# Patient Record
Sex: MALE | Race: BLACK OR AFRICAN AMERICAN | Employment: UNEMPLOYED | RURAL
[De-identification: names, ages, dates, MRNs, and addresses within clinical notes are randomized per-mention and may not be internally consistent; named-entity substitution may affect disease eponyms.]

---

## 2020-02-18 ENCOUNTER — OFFICE VISIT (OUTPATIENT)
Dept: INTERNAL MEDICINE CLINIC | Age: 55
End: 2020-02-18

## 2020-02-18 VITALS
BODY MASS INDEX: 42.66 KG/M2 | TEMPERATURE: 97.5 F | SYSTOLIC BLOOD PRESSURE: 129 MMHG | HEIGHT: 72 IN | DIASTOLIC BLOOD PRESSURE: 89 MMHG | WEIGHT: 315 LBS | HEART RATE: 85 BPM | OXYGEN SATURATION: 97 % | RESPIRATION RATE: 16 BRPM

## 2020-02-18 DIAGNOSIS — R10.30 LOWER ABDOMINAL PAIN: ICD-10-CM

## 2020-02-18 DIAGNOSIS — E11.65 TYPE 2 DIABETES MELLITUS WITH HYPERGLYCEMIA, WITHOUT LONG-TERM CURRENT USE OF INSULIN (HCC): Primary | ICD-10-CM

## 2020-02-18 DIAGNOSIS — E66.01 OBESITY, MORBID (HCC): ICD-10-CM

## 2020-02-18 DIAGNOSIS — I10 ESSENTIAL HYPERTENSION: ICD-10-CM

## 2020-02-18 RX ORDER — ASCORBIC ACID 250 MG
TABLET ORAL
COMMUNITY

## 2020-02-18 RX ORDER — LISINOPRIL 20 MG/1
TABLET ORAL
COMMUNITY
Start: 2020-01-14 | End: 2020-02-18 | Stop reason: SDUPTHER

## 2020-02-18 RX ORDER — METFORMIN HYDROCHLORIDE 500 MG/1
500 TABLET, EXTENDED RELEASE ORAL
COMMUNITY
Start: 2020-01-15 | End: 2020-02-18 | Stop reason: SDUPTHER

## 2020-02-18 RX ORDER — METFORMIN HYDROCHLORIDE 500 MG/1
500 TABLET, EXTENDED RELEASE ORAL
Qty: 90 TAB | Refills: 1 | Status: SHIPPED | OUTPATIENT
Start: 2020-02-18 | End: 2020-06-25 | Stop reason: DRUGHIGH

## 2020-02-18 RX ORDER — LISINOPRIL 20 MG/1
20 TABLET ORAL DAILY
Qty: 90 TAB | Refills: 1 | Status: SHIPPED | OUTPATIENT
Start: 2020-02-18 | End: 2020-07-08 | Stop reason: SDUPTHER

## 2020-02-18 RX ORDER — UREA 10 %
1000 LOTION (ML) TOPICAL
COMMUNITY
End: 2020-02-18

## 2020-02-18 NOTE — PROGRESS NOTES
Chief Complaint   Patient presents with   [address]     I have reviewed the patient's medical history in detail and updated the computerized patient record. Health Maintenance reviewed. Encouraged pt to discuss pt's wishes with spouse/partner/family and bring them in the next appt to follow thru with the Advanced Directive    Fall Risk Assessment, last 12 mths 2/18/2020   Able to walk? Yes   Fall in past 12 months? No       3 most recent PHQ Screens 2/18/2020   Little interest or pleasure in doing things Several days   Feeling down, depressed, irritable, or hopeless Several days   Total Score PHQ 2 2       Abuse Screening Questionnaire 2/18/2020   Do you ever feel afraid of your partner? N   Are you in a relationship with someone who physically or mentally threatens you? N   Is it safe for you to go home?  Y       ADL Assessment 2/18/2020   Feeding yourself No Help Needed   Getting from bed to chair No Help Needed   Getting dressed No Help Needed   Bathing or showering No Help Needed   Walk across the room (includes cane/walker) No Help Needed   Using the telphone No Help Needed   Taking your medications No Help Needed   Preparing meals No Help Needed   Managing money (expenses/bills) No Help Needed   Moderately strenuous housework (laundry) No Help Needed   Shopping for personal items (toiletries/medicines) No Help Needed   Shopping for groceries No Help Needed   Driving No Help Needed   Climbing a flight of stairs No Help Needed   Getting to places beyond walking distances No Help Needed

## 2020-02-18 NOTE — PROGRESS NOTES
Subjective:     Elsie Gallagher is a 48 y.o. male seen for follow-up of diabetes. Patient is new to this clinic. Comes in to establish care. Previous care was with . Reason for the change is: ?  About his medications, concerned about reactions liver problems which he is associated with some of his medication. Does not see his physician very often but his diabetes and hypertension have been reasonably well-controlled  He has had hypoglycemic attacks. .no  Blood sugar control has been good  He has diabetes and hypertension. Elsie Gallagher has the additional concern of c/o about the lisinopril and metformins safety and rxn      Diabetic Review of Systems: no polyuria or polydipsia, no chest pain, dyspnea or TIA's, no numbness, tingling or pain in extremities. No Known Allergies    Diet and Lifestyle: does not rigorously follow a diabetic diet, nonsmoker. Patient Active Problem List    Diagnosis Date Noted    Obesity, morbid ([address] 02/18/2020    Hyperglycemia due to type 2 diabetes mellitus ([facility] 02/18/2020    Essential hypertension 02/18/2020     Current Outpatient Medications   Medication Sig Dispense Refill    ascorbic acid, vitamin C, (VITAMIN C) 250 mg tablet Take  by mouth.  metFORMIN ER (GLUCOPHAGE XR) 500 mg tablet Take 1 Tab by mouth daily (with dinner). 90 Tab 1    lisinopril (PRINIVIL, ZESTRIL) 20 mg tablet Take 1 Tab by mouth daily. 90 Tab 1     No Known Allergies  Past Medical History:   Diagnosis Date    Diabetes ([facility]     Headache     Hypertension     Thyroid disease      Past Surgical History:   Procedure Laterality Date    CARDIAC SURG PROCEDURE UNLIST      HX COLONOSCOPY      HX ENDOSCOPY      HX ORTHOPAEDIC      repair quad muscle     No family history on file.   Social History     Tobacco Use    Smoking status: Light Tobacco Smoker     Types: Cigars    Smokeless tobacco: Never Used    Tobacco comment: 2 per mo cigar   Substance Use Topics    Alcohol use: Yes     Alcohol/week: 1.0 standard drinks     Types: 1 Shots of liquor per week     Comment: occ             Review of Systems  Pertinent items are noted in HPI. Objective:     Significant for the following:     Visit Vitals  /89 (BP 1 Location: Left arm, BP Patient Position: Sitting)   Pulse 85   Temp 97.5 °F (36.4 °C) (Oral)   Resp 16   Ht 6' (1.829 m)   Wt (!) 373 lb (169.2 kg)   SpO2 97%   BMI 50.59 kg/m²     Appearance: alert, well appearing, and in no distress. Exam: heart sounds normal rate, regular rhythm, normal S1, S2, no murmurs, rubs, clicks or gallops, chest clear  Foot exam: deferred    Lab review: orders written for new lab studies as appropriate; see orders. Assessment/Plan:     Follow-up diabetes stable. Diabetic issues reviewed with him: all medications, side effects and compliance discussed carefully, diabetic Sick Day rules reviewed, handout given, glycohemoglobin and other lab monitoring discussed and long term diabetic complications discussed. Chronic Conditions Addressed Today     1. Obesity, morbid (HCC)     Relevant Medications     metFORMIN ER (GLUCOPHAGE XR) 500 mg tablet    2. Hyperglycemia due to type 2 diabetes mellitus (HCC) - Primary     Relevant Medications     metFORMIN ER (GLUCOPHAGE XR) 500 mg tablet     lisinopril (PRINIVIL, ZESTRIL) 20 mg tablet     Other Relevant Orders     HEMOGLOBIN A1C WITH EAG    3. Essential hypertension     Relevant Medications     lisinopril (PRINIVIL, ZESTRIL) 20 mg tablet     Other Relevant Orders     METABOLIC PANEL, COMPREHENSIVE     LIPID PANEL      Acute Diagnoses Addressed Today     Lower abdominal pain            See patient instructions, went over them personally with the patient. Emphasized compliance. Questions answered. Patient states that they understand the plan of action and will call if there are any issues or misunderstandings.     Orders Placed This Encounter    METABOLIC PANEL, COMPREHENSIVE     Standing Status: Future     Standing Expiration Date:   8/20/2020    LIPID PANEL     Standing Status:   Future     Standing Expiration Date:   8/20/2020    HEMOGLOBIN A1C WITH EAG     Standing Status:   Future     Standing Expiration Date:   8/17/2020    DISCONTD: metFORMIN ER (GLUCOPHAGE XR) 500 mg tablet     Sig: Take 500 mg by mouth.  DISCONTD: lisinopril (PRINIVIL, ZESTRIL) 20 mg tablet    DISCONTD: cyanocobalamin (VITAMIN B12) 100 mcg tablet     Sig: Take 1,000 mcg by mouth.  ascorbic acid, vitamin C, (VITAMIN C) 250 mg tablet     Sig: Take  by mouth.  metFORMIN ER (GLUCOPHAGE XR) 500 mg tablet     Sig: Take 1 Tab by mouth daily (with dinner). Dispense:  90 Tab     Refill:  1    lisinopril (PRINIVIL, ZESTRIL) 20 mg tablet     Sig: Take 1 Tab by mouth daily. Dispense:  90 Tab     Refill:  1     Spent some time discussing possible reactions side effects other issues associated with the medicines which she currently takes. Went ahead and refilled him. Encouraged weight loss and following a diabetic diet. Follow-up and Dispositions    · Return in about 4 months (around 6/18/2020) for routine follow up.

## 2020-02-18 NOTE — PATIENT INSTRUCTIONS
Learning About Meal Planning for Diabetes Why plan your meals? Meal planning can be a key part of managing diabetes. Planning meals and snacks with the right balance of carbohydrate, protein, and fat can help you keep your blood sugar at the target level you set with your doctor. You don't have to eat special foods. You can eat what your family eats, including sweets once in a while. But you do have to pay attention to how often you eat and how much you eat of certain foods. You may want to work with a dietitian or a certified diabetes educator. He or she can give you tips and meal ideas and can answer your questions about meal planning. This health professional can also help you reach a healthy weight if that is one of your goals. What plan is right for you? Your dietitian or diabetes educator may suggest that you start with the plate format or carbohydrate counting. The plate format The plate format is a simple way to help you manage how you eat. You plan meals by learning how much space each food should take on a plate. Using the plate format helps you spread carbohydrate throughout the day. It can make it easier to keep your blood sugar level within your target range. It also helps you see if you're eating healthy portion sizes. To use the plate format, you put non-starchy vegetables on half your plate. Add meat or meat substitutes on one-quarter of the plate. Put a grain or starchy vegetable (such as brown rice or a potato) on the final quarter of the plate. You can add a small piece of fruit and some low-fat or fat-free milk or yogurt, depending on your carbohydrate goal for each meal. 
Here are some tips for using the plate format: · Make sure that you are not using an oversized plate. A 9-inch plate is best. Many restaurants use larger plates. · Get used to using the plate format at home. Then you can use it when you eat out. · Write down your questions about using the plate format. Talk to your doctor, a dietitian, or a diabetes educator about your concerns. Carbohydrate counting With carbohydrate counting, you plan meals based on the amount of carbohydrate in each food. Carbohydrate raises blood sugar higher and more quickly than any other nutrient. It is found in desserts, breads and cereals, and fruit. It's also found in starchy vegetables such as potatoes and corn, grains such as rice and pasta, and milk and yogurt. Spreading carbohydrate throughout the day helps keep your blood sugar levels within your target range. Your daily amount depends on several things, including your weight, how active you are, which diabetes medicines you take, and what your goals are for your blood sugar levels. A registered dietitian or diabetes educator can help you plan how much carbohydrate to include in each meal and snack. A guideline for your daily amount of carbohydrate is: · 45 to 60 grams at each meal. That's about the same as 3 to 4 carbohydrate servings. · 15 to 20 grams at each snack. That's about the same as 1 carbohydrate serving. The Nutrition Facts label on packaged foods tells you how much carbohydrate is in a serving of the food. First, look at the serving size on the food label. Is that the amount you eat in a serving? All of the nutrition information on a food label is based on that serving size. So if you eat more or less than that, you'll need to adjust the other numbers. Total carbohydrate is the next thing you need to look for on the label. If you count carbohydrate servings, one serving of carbohydrate is 15 grams. For foods that don't come with labels, such as fresh fruits and vegetables, you'll need a guide that lists carbohydrate in these foods. Ask your doctor, dietitian, or diabetes educator about books or other nutrition guides you can use.  
If you take insulin, you need to know how many grams of carbohydrate are in a meal. This lets you know how much rapid-acting insulin to take before you eat. If you use an insulin pump, you get a constant rate of insulin during the day. So the pump must be programmed at meals to give you extra insulin to cover the rise in blood sugar after meals. When you know how much carbohydrate you will eat, you can take the right amount of insulin. Or, if you always use the same amount of insulin, you need to make sure that you eat the same amount of carbohydrate at meals. If you need more help to understand carbohydrate counting and food labels, ask your doctor, dietitian, or diabetes educator. How do you get started with meal planning? Here are some tips to get started: 
· Plan your meals a week at a time. Don't forget to include snacks too. · Use cookbooks or online recipes to plan several main meals. Plan some quick meals for busy nights. You also can double some recipes that freeze well. Then you can save half for other busy nights when you don't have time to cook. · Make sure you have the ingredients you need for your recipes. If you're running low on basic items, put these items on your shopping list too. · List foods that you use to make breakfasts, lunches, and snacks. List plenty of fruits and vegetables. · Post this list on the refrigerator. Add to it as you think of more things you need. · Take the list to the store to do your weekly shopping. Follow-up care is a key part of your treatment and safety. Be sure to make and go to all appointments, and call your doctor if you are having problems. It's also a good idea to know your test results and keep a list of the medicines you take. Where can you learn more? Go to http://lorena-walter.info/. Brock Hernandez in the search box to learn more about \"Learning About Meal Planning for Diabetes. \" Current as of: April 16, 2019 Content Version: 12.2 © 2765-3623 [employer].  Care instructions adapted under license by [facility] [address] (which disclaims liability or warranty for this information). If you have questions about a medical condition or this instruction, always ask your healthcare professional. [address] any warranty or liability for your use of this information. Learning About Diabetes Food Guidelines Your Care Instructions Meal planning is important to manage diabetes. It helps keep your blood sugar at a target level (which you set with your doctor). You don't have to eat special foods. You can eat what your family eats, including sweets once in a while. But you do have to pay attention to how often you eat and how much you eat of certain foods. You may want to work with a dietitian or a certified diabetes educator (CDE) to help you plan meals and snacks. A dietitian or CDE can also help you lose weight if that is one of your goals. What should you know about eating carbs? Managing the amount of carbohydrate (carbs) you eat is an important part of healthy meals when you have diabetes. Carbohydrate is found in many foods. · Learn which foods have carbs. And learn the amounts of carbs in different foods. ? Bread, cereal, pasta, and rice have about 15 grams of carbs in a serving. A serving is 1 slice of bread (1 ounce), ½ cup of cooked cereal, or 1/3 cup of cooked pasta or rice. ? Fruits have 15 grams of carbs in a serving. A serving is 1 small fresh fruit, such as an apple or orange; ½ of a banana; ½ cup of cooked or canned fruit; ½ cup of fruit juice; 1 cup of melon or raspberries; or 2 tablespoons of dried fruit. ? Milk and no-sugar-added yogurt have 15 grams of carbs in a serving. A serving is 1 cup of milk or 2/3 cup of no-sugar-added yogurt. ? Starchy vegetables have 15 grams of carbs in a serving.  A serving is ½ cup of mashed potatoes or sweet potato; 1 cup winter squash; ½ of a small baked potato; ½ cup of cooked beans; or ½ cup cooked corn or green peas. · Learn how much carbs to eat each day and at each meal. A dietitian or CDE can teach you how to keep track of the amount of carbs you eat. This is called carbohydrate counting. · If you are not sure how to count carbohydrate grams, use the Plate Method to plan meals. It is a good, quick way to make sure that you have a balanced meal. It also helps you spread carbs throughout the day. ? Divide your plate by types of foods. Put non-starchy vegetables on half the plate, meat or other protein food on one-quarter of the plate, and a grain or starchy vegetable in the final quarter of the plate. To this you can add a small piece of fruit and 1 cup of milk or yogurt, depending on how many carbs you are supposed to eat at a meal. 
· Try to eat about the same amount of carbs at each meal. Do not \"save up\" your daily allowance of carbs to eat at one meal. 
· Proteins have very little or no carbs per serving. Examples of proteins are beef, chicken, turkey, fish, eggs, tofu, cheese, cottage cheese, and peanut butter. A serving size of meat is 3 ounces, which is about the size of a deck of cards. Examples of meat substitute serving sizes (equal to 1 ounce of meat) are 1/4 cup of cottage cheese, 1 egg, 1 tablespoon of peanut butter, and ½ cup of tofu. How can you eat out and still eat healthy? · Learn to estimate the serving sizes of foods that have carbohydrate. If you measure food at home, it will be easier to estimate the amount in a serving of restaurant food. · If the meal you order has too much carbohydrate (such as potatoes, corn, or baked beans), ask to have a low-carbohydrate food instead. Ask for a salad or green vegetables. · If you use insulin, check your blood sugar before and after eating out to help you plan how much to eat in the future.  
· If you eat more carbohydrate at a meal than you had planned, take a walk or do other exercise. This will help lower your blood sugar. What else should you know? · Limit saturated fat, such as the fat from meat and dairy products. This is a healthy choice because people who have diabetes are at higher risk of heart disease. So choose lean cuts of meat and nonfat or low-fat dairy products. Use olive or canola oil instead of butter or shortening when cooking. · Don't skip meals. Your blood sugar may drop too low if you skip meals and take insulin or certain medicines for diabetes. · Check with your doctor before you drink alcohol. Alcohol can cause your blood sugar to drop too low. Alcohol can also cause a bad reaction if you take certain diabetes medicines. Follow-up care is a key part of your treatment and safety. Be sure to make and go to all appointments, and call your doctor if you are having problems. It's also a good idea to know your test results and keep a list of the medicines you take. Where can you learn more? Go to http://lorena-walter.info/. Enter L412 in the search box to learn more about \"Learning About Diabetes Food Guidelines. \" Current as of: April 16, 2019 Content Version: 12.2 © 5825-2367 [employer]. Care instructions adapted under license by [facility] (which disclaims liability or warranty for this information). If you have questions about a medical condition or this instruction, always ask your healthcare professional. [address] any warranty or liability for your use of this information. Learning About Low-Carbohydrate Diets for Weight Loss What is a low-carbohydrate diet? Low-carb diets avoid foods that are high in carbohydrate. These high-carb foods include pasta, bread, rice, cereal, fruits, and starchy vegetables. Instead, these diets usually have you eat foods that are high in fat and protein. Many people lose weight quickly on a low-carb diet.  But the early weight loss is water. People on this diet often gain the weight back after they start eating carbs again. Not all diet plans are safe or work well. A lot of the evidence shows that low-carb diets aren't healthy. That's because these diets often don't include healthy foods like fruits and vegetables. Losing weight safely means balancing protein, fat, and carbs with every meal and snack. And low-carb diets don't always provide the vitamins, minerals, and fiber you need. If you have a serious medical condition, talk to your doctor before you try any diet. These conditions include kidney disease, heart disease, type 2 diabetes, high cholesterol, and high blood pressure. If you are pregnant, it may not be safe for your baby if you are on a low-carb diet. How can you lose weight safely? You might have heard that a diet plan helped another person lose weight. But that doesn't mean that it will work for you. It is very hard to stay on a diet that includes lots of big changes in your eating habits. If you want to get to a healthy weight and stay there, making healthy lifestyle changes will often work better than dieting. These steps can help. · Make a plan for change. Work with your doctor to create a plan that is right for you. · See a dietitian. He or she can show you how to make healthy changes in your eating habits. · Manage stress. If you have a lot of stress in your life, it can be hard to focus on making healthy changes to your daily habits. · Track your food and activity. You are likely to do better at losing weight if you keep track of what you eat and what you do. Follow-up care is a key part of your treatment and safety. Be sure to make and go to all appointments, and call your doctor if you are having problems. It's also a good idea to know your test results and keep a list of the medicines you take. Where can you learn more? Go to http://lorena-walter.info/. Enter A121 in the search box to learn more about \"Learning About Low-Carbohydrate Diets for Weight Loss. \" Current as of: November 7, 2018 Content Version: 12.2 © 4292-5498 [employer]. Care instructions adapted under license by [employer] (which disclaims liability or warranty for this information). If you have questions about a medical condition or this instruction, always ask your healthcare professional. [place] [address] any warranty or liability for your use of this information.

## 2020-05-06 ENCOUNTER — TELEPHONE (OUTPATIENT)
Dept: INTERNAL MEDICINE CLINIC | Age: 55
End: 2020-05-06

## 2020-05-06 NOTE — TELEPHONE ENCOUNTER
Called pt to set up an appt to do A1C check. He said that he will go to [facility] to get it done once everything clears up. Pt said that he may possibly try to go this week.

## 2020-06-25 ENCOUNTER — TELEPHONE (OUTPATIENT)
Dept: INTERNAL MEDICINE CLINIC | Age: 55
End: 2020-06-25

## 2020-06-25 LAB
ALBUMIN SERPL-MCNC: 4 G/DL (ref 3.8–4.9)
ALBUMIN/GLOB SERPL: 1.4 {RATIO} (ref 1.2–2.2)
ALP SERPL-CCNC: 73 IU/L (ref 39–117)
ALT SERPL-CCNC: 24 IU/L (ref 0–44)
AST SERPL-CCNC: 13 IU/L (ref 0–40)
BILIRUB SERPL-MCNC: <0.2 MG/DL (ref 0–1.2)
BUN SERPL-MCNC: 13 MG/DL (ref 6–24)
BUN/CREAT SERPL: 18 (ref 9–20)
CALCIUM SERPL-MCNC: 9.4 MG/DL (ref 8.7–10.2)
CHLORIDE SERPL-SCNC: 100 MMOL/L (ref 96–106)
CHOLEST SERPL-MCNC: 145 MG/DL (ref 100–199)
CO2 SERPL-SCNC: 24 MMOL/L (ref 20–29)
CREAT SERPL-MCNC: 0.74 MG/DL (ref 0.76–1.27)
EST. AVERAGE GLUCOSE BLD GHB EST-MCNC: 194 MG/DL
GLOBULIN SER CALC-MCNC: 2.8 G/DL (ref 1.5–4.5)
GLUCOSE SERPL-MCNC: 224 MG/DL (ref 65–99)
HBA1C MFR BLD: 8.4 % (ref 4.8–5.6)
HDLC SERPL-MCNC: 47 MG/DL
INTERPRETATION, 910389: NORMAL
LDLC SERPL CALC-MCNC: 66 MG/DL (ref 0–99)
Lab: NORMAL
POTASSIUM SERPL-SCNC: 4.8 MMOL/L (ref 3.5–5.2)
PROT SERPL-MCNC: 6.8 G/DL (ref 6–8.5)
SODIUM SERPL-SCNC: 139 MMOL/L (ref 134–144)
TRIGL SERPL-MCNC: 162 MG/DL (ref 0–149)
VLDLC SERPL CALC-MCNC: 32 MG/DL (ref 5–40)

## 2020-06-25 RX ORDER — METFORMIN HYDROCHLORIDE 500 MG/1
500 TABLET ORAL 2 TIMES DAILY WITH MEALS
Qty: 180 TAB | Refills: 3 | Status: SHIPPED | OUTPATIENT
Start: 2020-06-25 | End: 2020-07-08 | Stop reason: SDUPTHER

## 2020-06-25 NOTE — TELEPHONE ENCOUNTER
Send normal/stable results letter. Your results are normal/stable. If not signed up, consider getting my chart to get your results on-line. We can help you to sign up. A1C is OKay but a hint higher than we like. As we discussed, change metformin ER to regular metformin which you will take twice daily. We will see you for your regularly scheduled follow-up.

## 2020-06-25 NOTE — LETTER
6/25/2020 3:09 PM 
 
Mr. Finch Left [address] [place] [id number] Dear  [facility]: 
 
Your results are normal/stable. If not signed up, consider getting my chart to get your results on-line. We can help you to sign up. A1C is OKay but a hint higher than we like. As we discussed, change metformin ER to regular metformin which you will take twice daily. We will see you for your regularly scheduled follow-up. Sincerely, Bette Solis MD

## 2020-07-08 NOTE — TELEPHONE ENCOUNTER
Requested Prescriptions     Pending Prescriptions Disp Refills    lisinopriL (PRINIVIL, ZESTRIL) 20 mg tablet 90 Tab 1     Sig: Take 1 Tab by mouth daily.  metFORMIN (GLUCOPHAGE) 500 mg tablet 180 Tab 3     Sig: Take 1 Tab by mouth two (2) times daily (with meals).

## 2020-07-09 RX ORDER — METFORMIN HYDROCHLORIDE 500 MG/1
500 TABLET ORAL 2 TIMES DAILY WITH MEALS
Qty: 180 TAB | Refills: 3 | Status: SHIPPED | OUTPATIENT
Start: 2020-07-09 | End: 2020-11-10 | Stop reason: ALTCHOICE

## 2020-07-09 RX ORDER — LISINOPRIL 20 MG/1
20 TABLET ORAL DAILY
Qty: 90 TAB | Refills: 1 | Status: SHIPPED | OUTPATIENT
Start: 2020-07-09 | End: 2020-11-10 | Stop reason: SDUPTHER

## 2020-07-14 ENCOUNTER — OFFICE VISIT (OUTPATIENT)
Dept: INTERNAL MEDICINE CLINIC | Age: 55
End: 2020-07-14

## 2020-07-14 VITALS
BODY MASS INDEX: 42.66 KG/M2 | RESPIRATION RATE: 18 BRPM | SYSTOLIC BLOOD PRESSURE: 126 MMHG | HEART RATE: 89 BPM | TEMPERATURE: 97.8 F | DIASTOLIC BLOOD PRESSURE: 83 MMHG | OXYGEN SATURATION: 96 % | HEIGHT: 72 IN | WEIGHT: 315 LBS

## 2020-07-14 DIAGNOSIS — R41.3 COMPLAINTS OF MEMORY DISTURBANCE: ICD-10-CM

## 2020-07-14 DIAGNOSIS — Z12.5 SCREENING PSA (PROSTATE SPECIFIC ANTIGEN): ICD-10-CM

## 2020-07-14 DIAGNOSIS — E66.01 OBESITY, MORBID (HCC): ICD-10-CM

## 2020-07-14 DIAGNOSIS — E11.65 TYPE 2 DIABETES MELLITUS WITH HYPERGLYCEMIA, WITHOUT LONG-TERM CURRENT USE OF INSULIN (HCC): Primary | ICD-10-CM

## 2020-07-14 DIAGNOSIS — I10 ESSENTIAL HYPERTENSION: ICD-10-CM

## 2020-07-14 LAB
BILIRUB UR QL STRIP: NEGATIVE
GLUCOSE UR-MCNC: NEGATIVE MG/DL
KETONES P FAST UR STRIP-MCNC: NEGATIVE MG/DL
PH UR STRIP: 5.5 [PH] (ref 4.6–8)
PROT UR QL STRIP: NEGATIVE
SP GR UR STRIP: 1.02 (ref 1–1.03)
UA UROBILINOGEN AMB POC: NORMAL (ref 0.2–1)
URINALYSIS CLARITY POC: CLEAR
URINALYSIS COLOR POC: NORMAL
URINE BLOOD POC: NEGATIVE
URINE LEUKOCYTES POC: NEGATIVE
URINE NITRITES POC: NEGATIVE

## 2020-07-14 NOTE — PATIENT INSTRUCTIONS
SGLT2 Inhibitors:  Pedro Patel    GLP agonist: Domingo Garcia Tanzeum Rybelsus            Nutrition Tips for Diabetes: After Your Visit  Your Care Instructions  A healthy diet is important to manage diabetes. It helps you lose weight (if you need to) and keep it off. It gives you the nutrition and energy your body needs and helps prevent heart disease. But a diet for diabetes does not mean that you have to eat special foods. You can eat what your family eats, including occasional sweets and other favorites. But you do have to pay attention to how often you eat and how much you eat of certain foods. The right plan for you will give you meals that help you keep your blood sugar at healthy levels. Try to eat a variety of foods and to spread carbohydrate throughout the day. Carbohydrate raises blood sugar higher and more quickly than any other nutrient does. Carbohydrate is found in sugar, breads and cereals, fruit, starchy vegetables such as potatoes and corn, and milk and yogurt. You may want to work with a dietitian or diabetes educator to help you plan meals and snacks. A dietitian or diabetes educator also can help you lose weight if that is one of your goals. The following tips can help you enjoy your meals and stay healthy. Follow-up care is a key part of your treatment and safety. Be sure to make and go to all appointments, and call your doctor if you are having problems. Its also a good idea to know your test results and keep a list of the medicines you take. How can you care for yourself at home? · Learn which foods have carbohydrate and how much carbohydrate to eat. A dietitian or diabetes educator can help you learn to keep track of how much carbohydrate you eat. · Spread carbohydrate throughout the day. Eat some carbohydrate at all meals, but do not eat too much at any one time. · Plan meals to include food from all the food groups.  These are the food groups and some example portion sizes:  ¨ Grains: 1 slice of bread (1 ounce), ½ cup of cooked cereal, and 1/3 cup of cooked pasta or rice. These have about 15 grams of carbohydrate in a serving. Choose whole grains such as whole wheat bread or crackers, oatmeal, and brown rice more often than refined grains. ¨ Fruit: 1 small fresh fruit, such as an apple or orange; ½ of a banana; ½ cup of chopped, cooked, or canned fruit; ½ cup of fruit juice; 1 cup of melon or raspberries; and 2 tablespoons of dried fruit. These have about 15 grams of carbohydrate in a serving. ¨ Dairy: 1 cup of nonfat or low-fat milk and 2/3 cup of plain yogurt. These have about 15 grams of carbohydrate in a serving. ¨ Protein foods: Beef, chicken, turkey, fish, eggs, tofu, cheese, cottage cheese, and peanut butter. A serving size of meat is 3 ounces, which is about the size of a deck of cards. Examples of meat substitute serving sizes (equal to 1 ounce of meat) are 1/4 cup of cottage cheese, 1 egg, 1 tablespoon of peanut butter, and ½ cup of tofu. These have very little or no carbohydrate per serving. ¨ Vegetables: Starchy vegetables such as ½ cup of cooked dried beans, peas, potatoes, or corn have about 15 grams of carbohydrate. Nonstarchy vegetables have very little carbohydrate, such as 1 cup of raw leafy vegetables (such as spinach), ½ cup of other vegetables (cooked or chopped), and 3/4 cup of vegetable juice. · Use the plate format to plan meals. It is a good, quick way to make sure that you have a balanced meal. It also helps you spread carbohydrate throughout the day. You divide your plate by types of foods. Put vegetables on half the plate, meat or meat substitutes on one-quarter of the plate, and a grain or starchy vegetable (such as brown rice or a potato) in the final quarter of the plate.  To this you can add a small piece of fruit and 1 cup of milk or yogurt, depending on how much carbohydrate you are supposed to eat at a meal.  · Talk to your dietitian or diabetes educator about ways to add limited amounts of sweets into your meal plan. You can eat these foods now and then, as long as you include the amount of carbohydrate they have in your daily carbohydrate allowance. · If you drink alcohol, limit it to no more than 1 drink a day for women and 2 drinks a day for men. If you are pregnant, no amount of alcohol is known to be safe. · Protein, fat, and fiber do not raise blood sugar as much as carbohydrate does. If you eat a lot of these nutrients in a meal, your blood sugar will rise more slowly than it would otherwise. · Limit saturated fats, such as those from meat and dairy products. Try to replace it with monounsaturated fat, such as olive oil. This is a healthier choice because people who have diabetes are at higher-than-average risk of heart disease. But use a modest amount of olive oil. A tablespoon of olive oil has 14 grams of fat and 120 calories. · Exercise lowers blood sugar. If you take insulin by shots or pump, you can use less than you would if you were not exercising. Keep in mind that timing matters. If you exercise within 1 hour after a meal, your body may need less insulin for that meal than it would if you exercised 3 hours after the meal. Test your blood sugar to find out how exercise affects your need for insulin. · Exercise on most days of the week. Aim for at least 30 minutes. Exercise helps you stay at a healthy weight and helps your body use insulin. Walking is an easy way to get exercise. Gradually increase the amount you walk every day. You also may want to swim, bike, or do other activities. When you eat out  · Learn to estimate the serving sizes of foods that have carbohydrate. If you measure food at home, it will be easier to estimate the amount in a serving of restaurant food.   · If the meal you order has too much carbohydrate (such as potatoes, corn, or baked beans), ask to have a low-carbohydrate food instead. Ask for a salad or green vegetables. · If you use insulin, check your blood sugar before and after eating out to help you plan how much to eat in the future. · If you eat more carbohydrate at a meal than you had planned, take a walk or do other exercise. This will help lower your blood sugar. Where can you learn more? Go to [employer].be  Enter V200 in the search box to learn more about \"Nutrition Tips for Diabetes: After Your Visit. \"   © 1655-0257 Healthwise, [employer]. Care instructions adapted under license by [employer] (which disclaims liability or warranty for this information). This care instruction is for use with your licensed healthcare professional. If you have questions about a medical condition or this instruction, always ask your healthcare professional. [address] any warranty or liability for your use of this information. Content Version: 39.4.335288; Current as of: June 4, 2014                 Learning About Diabetes Food Guidelines  Your Care Instructions     Meal planning is important to manage diabetes. It helps keep your blood sugar at a target level (which you set with your doctor). You don't have to eat special foods. You can eat what your family eats, including sweets once in a while. But you do have to pay attention to how often you eat and how much you eat of certain foods. You may want to work with a dietitian or a certified diabetes educator (CDE) to help you plan meals and snacks. A dietitian or CDE can also help you lose weight if that is one of your goals. What should you know about eating carbs? Managing the amount of carbohydrate (carbs) you eat is an important part of healthy meals when you have diabetes. Carbohydrate is found in many foods. · Learn which foods have carbs. And learn the amounts of carbs in different foods.   ? Bread, cereal, pasta, and rice have about 15 grams of carbs in a serving. A serving is 1 slice of bread (1 ounce), ½ cup of cooked cereal, or 1/3 cup of cooked pasta or rice. ? Fruits have 15 grams of carbs in a serving. A serving is 1 small fresh fruit, such as an apple or orange; ½ of a banana; ½ cup of cooked or canned fruit; ½ cup of fruit juice; 1 cup of melon or raspberries; or 2 tablespoons of dried fruit. ? Milk and no-sugar-added yogurt have 15 grams of carbs in a serving. A serving is 1 cup of milk or 2/3 cup of no-sugar-added yogurt. ? Starchy vegetables have 15 grams of carbs in a serving. A serving is ½ cup of mashed potatoes or sweet potato; 1 cup winter squash; ½ of a small baked potato; ½ cup of cooked beans; or ½ cup cooked corn or green peas. · Learn how much carbs to eat each day and at each meal. A dietitian or CDE can teach you how to keep track of the amount of carbs you eat. This is called carbohydrate counting. · If you are not sure how to count carbohydrate grams, use the Plate Method to plan meals. It is a good, quick way to make sure that you have a balanced meal. It also helps you spread carbs throughout the day. ? Divide your plate by types of foods. Put non-starchy vegetables on half the plate, meat or other protein food on one-quarter of the plate, and a grain or starchy vegetable in the final quarter of the plate. To this you can add a small piece of fruit and 1 cup of milk or yogurt, depending on how many carbs you are supposed to eat at a meal.  · Try to eat about the same amount of carbs at each meal. Do not \"save up\" your daily allowance of carbs to eat at one meal.  · Proteins have very little or no carbs per serving. Examples of proteins are beef, chicken, turkey, fish, eggs, tofu, cheese, cottage cheese, and peanut butter. A serving size of meat is 3 ounces, which is about the size of a deck of cards.  Examples of meat substitute serving sizes (equal to 1 ounce of meat) are 1/4 cup of cottage cheese, 1 egg, 1 tablespoon of peanut butter, and ½ cup of tofu. How can you eat out and still eat healthy? · Learn to estimate the serving sizes of foods that have carbohydrate. If you measure food at home, it will be easier to estimate the amount in a serving of restaurant food. · If the meal you order has too much carbohydrate (such as potatoes, corn, or baked beans), ask to have a low-carbohydrate food instead. Ask for a salad or green vegetables. · If you use insulin, check your blood sugar before and after eating out to help you plan how much to eat in the future. · If you eat more carbohydrate at a meal than you had planned, take a walk or do other exercise. This will help lower your blood sugar. What else should you know? · Limit saturated fat, such as the fat from meat and dairy products. This is a healthy choice because people who have diabetes are at higher risk of heart disease. So choose lean cuts of meat and nonfat or low-fat dairy products. Use olive or canola oil instead of butter or shortening when cooking. · Don't skip meals. Your blood sugar may drop too low if you skip meals and take insulin or certain medicines for diabetes. · Check with your doctor before you drink alcohol. Alcohol can cause your blood sugar to drop too low. Alcohol can also cause a bad reaction if you take certain diabetes medicines. Follow-up care is a key part of your treatment and safety. Be sure to make and go to all appointments, and call your doctor if you are having problems. It's also a good idea to know your test results and keep a list of the medicines you take. Where can you learn more? Go to http://lorena-walter.info/  Enter I147 in the search box to learn more about \"Learning About Diabetes Food Guidelines. \"  Current as of: December 20, 2019               Content Version: 12.5  © 4510-0540 Healthwise, Incorporated.    Care instructions adapted under license by [employer] (which disclaims liability or warranty for this information). If you have questions about a medical condition or this instruction, always ask your healthcare professional. [address] any warranty or liability for your use of this information. Prostate Cancer Screening: Care Instructions  Your Care Instructions     Prostate cancer is the abnormal growth of cells in the prostate gland. This is an organ found just below a man's bladder. Screening can help find prostate cancer early. When it is found and treated early, the cancer may be cured. But it's not always treated. That's because the treatments can cause serious side effects. For most men, prostate cancer won't shorten their lives, especially if they are older and the cancer is growing slowly. Prostate cancer is the second most common type of cancer in men. Most cases occur in men older than 72. The disease runs in families. And it's more common in -American men. Follow-up care is a key part of your treatment and safety. Be sure to make and go to all appointments, and call your doctor if you are having problems. It's also a good idea to know your test results and keep a list of the medicines you take. What is the screening test for prostate cancer? The main screening test for prostate cancer is the prostate-specific antigen (PSA) test. This is a blood test that measures how much PSA is in your blood. A high level may mean that you have an enlarged prostate, an infection, or cancer. Along with the PSA test, you may have a digital (finger) rectal exam. This exam checks for anything abnormal in your prostate. To do the exam, the doctor puts a lubricated, gloved finger into your rectum. If these tests suggest cancer, you may need a prostate biopsy. How is prostate cancer diagnosed? In a biopsy, the doctor takes small tissue samples from your prostate gland.  Another doctor then looks at the tissue under a microscope to see if there are cancer cells, signs of infection, or other problems. The results help diagnose prostate cancer. What are the pros and cons of screening? Neither a PSA test nor a digital rectal exam can tell you for sure that you do or do not have cancer. But they can help you decide if you need more tests, such as a prostate biopsy. Screening tests may be useful because most men with prostate cancer don't have symptoms. It can be hard to know if you have cancer until it is more advanced. And then it's harder to treat. But having a PSA test can also cause harm. The test may show high levels of PSA that aren't caused by cancer. So you could have a prostate biopsy you didn't need. Or the PSA test might be normal when there is cancer, so a cancer might not be found early. The test can also find cancers that would never have caused a problem during your lifetime. So you might have treatment that was not needed. Prostate cancer usually develops late in life and grows slowly. For many men, it does not shorten their lives. Some experts advise screening only for men who are at high risk. Talk with your doctor to see if screening is right for you. Where can you learn more? Go to http://www.gray.com/  Enter R550 in the search box to learn more about \"Prostate Cancer Screening: Care Instructions. \"  Current as of: August 22, 2019               Content Version: 12.5  © 6153-5663 Healthwise, Incorporated. Care instructions adapted under license by [employer] (which disclaims liability or warranty for this information). If you have questions about a medical condition or this instruction, always ask your healthcare professional. [place] [address] any warranty or liability for your use of this information.

## 2020-07-14 NOTE — PROGRESS NOTES
Chief Complaint   Patient presents with    Hypertension     follow up     Patient has not been out of the country in (3-4 months) 90 -120 days, NO diarrhea, NO cough, NO chest conjestion, NO temp. Pt has not been around anyone with these symptoms. Health Maintenance reviewed. I have reviewed the patient's medical history in detail and updated the computerized patient record. Patient has not been out of the country in (3-4 months) 90 -120 days, NO diarrhea, NO cough, NO chest conjestion, NO temp. Pt has not been around anyone with these symptoms. 1. Have you been to the ER, urgent care clinic since your last visit? Hospitalized since your last visit?no    2. Have you seen or consulted any other health care providers outside of the [address] [place] [address] since your last visit? Include any pap smears or colon screening. No    Encouraged pt to discuss pt's wishes with spouse/partner/family and bring them in the next appt to follow thru with the Advanced Directive    Fall Risk Assessment, last 12 mths 7/14/2020   Able to walk? Yes   Fall in past 12 months? No       3 most recent PHQ Screens 7/14/2020   Little interest or pleasure in doing things Several days   Feeling down, depressed, irritable, or hopeless Several days   Total Score PHQ 2 2       Abuse Screening Questionnaire 7/14/2020   Do you ever feel afraid of your partner? N   Are you in a relationship with someone who physically or mentally threatens you? N   Is it safe for you to go home?  Y       ADL Assessment 7/14/2020   Feeding yourself No Help Needed   Getting from bed to chair No Help Needed   Getting dressed No Help Needed   Bathing or showering No Help Needed   Walk across the room (includes cane/walker) No Help Needed   Using the telphone No Help Needed   Taking your medications No Help Needed   Preparing meals No Help Needed   Managing money (expenses/bills) No Help Needed   Moderately strenuous housework (laundry) No Help Needed Shopping for personal items (toiletries/medicines) No Help Needed   Shopping for groceries No Help Needed   Driving No Help Needed   Climbing a flight of stairs No Help Needed   Getting to places beyond walking distances -

## 2020-07-15 LAB
ALBUMIN/CREAT UR: 11 MG/G CREAT (ref 0–29)
CREAT UR-MCNC: 115.2 MG/DL
MICROALBUMIN UR-MCNC: 13 UG/ML

## 2020-07-16 NOTE — PROGRESS NOTES
Subjective:     Vinetta Najjar is a 48 y.o. male seen for follow-up of diabetes. Wants to get his PSA checked. He has had hypoglycemic attacks. .no  Blood sugar control has been so-so. Complains of being more forgetful, says his wife has noted that, symptoms times few months. Increased stress recently. Lab Results   Component Value Date/Time    Hemoglobin A1c 8.4 (H) 06/24/2020 01:06 PM    Glucose 224 (H) 06/24/2020 01:06 PM    Microalb/Creat ratio (ug/mg creat.) 11 07/14/2020 01:20 PM    LDL, calculated 66 06/24/2020 01:06 PM    Creatinine 0.74 (L) 06/24/2020 01:06 PM       He has diabetes, hypertension and hyperlipidemia. Vinetta Najjar has the additional concern of wants to get PSA checked, best to catch it before a CA spreads. Diabetic Review of Systems: no polyuria or polydipsia, no chest pain, dyspnea or TIA's, no numbness, tingling or pain in extremities. No Known Allergies    Diet and Lifestyle: does not rigorously follow a diabetic diet, nonsmoker. Patient Active Problem List    Diagnosis Date Noted    Obesity, morbid ([address] 02/18/2020    Hyperglycemia due to type 2 diabetes mellitus ([address] 02/18/2020    Essential hypertension 02/18/2020     Current Outpatient Medications   Medication Sig Dispense Refill    lisinopriL (PRINIVIL, ZESTRIL) 20 mg tablet Take 1 Tab by mouth daily. 90 Tab 1    metFORMIN (GLUCOPHAGE) 500 mg tablet Take 1 Tab by mouth two (2) times daily (with meals). 180 Tab 3    ascorbic acid, vitamin C, (VITAMIN C) 250 mg tablet Take  by mouth. No Known Allergies  Past Medical History:   Diagnosis Date    Diabetes ([address]     Headache     Hypertension     Thyroid disease      Social History     Tobacco Use    Smoking status: Current Some Day Smoker     Types: Cigars    Smokeless tobacco: Never Used    Tobacco comment: 2 per mo cigar   Substance Use Topics    Alcohol use:  Yes     Alcohol/week: 1.0 standard drinks     Types: 1 Shots of liquor per week Comment: occ        Lab Results   Component Value Date/Time    GFR est non- 06/24/2020 01:06 PM    GFR est  06/24/2020 01:06 PM    Creatinine 0.74 (L) 06/24/2020 01:06 PM    BUN 13 06/24/2020 01:06 PM    Sodium 139 06/24/2020 01:06 PM    Potassium 4.8 06/24/2020 01:06 PM    Chloride 100 06/24/2020 01:06 PM    CO2 24 06/24/2020 01:06 PM     Lab Results   Component Value Date/Time    Glucose 224 (H) 06/24/2020 01:06 PM         Review of Systems  Pertinent items are noted in HPI. Objective:     Significant for the following:     Visit Vitals  /83 (BP 1 Location: Left arm, BP Patient Position: Sitting)   Pulse 89   Temp 97.8 °F (36.6 °C) (Temporal)   Resp 18   Ht 6' (1.829 m)   Wt (!) 370 lb (167.8 kg)   SpO2 96%   BMI 50.18 kg/m²     Appearance: alert, well appearing, and in no distress and overweight. Exam: heart sounds normal rate, regular rhythm, normal S1, S2, no murmurs, rubs, clicks or gallops, chest clear, no hepatosplenomegaly  Foot exam: Diabetic foot exam was performed. No obvious sores or red lesions. Sensation checked by monofilament exam which was normal.  Dorsalis pedis pulse waspresent. Lab review: orders written for new lab studies as appropriate; see orders. Assessment/Plan:     Follow-up diabetes stable, control uncertain. Diabetic issues reviewed with him: all medications, side effects and compliance discussed carefully, glycohemoglobin and other lab monitoring discussed and long term diabetic complications discussed. Chronic Conditions Addressed Today     1. Obesity, morbid ([address]    2. Hyperglycemia due to type 2 diabetes mellitus ([facility] - Primary     Relevant Orders     HEMOGLOBIN A1C WITH EAG     AMB POC URINALYSIS DIP STICK MANUAL W/O MICRO (Completed)     MICROALBUMIN, UR, RAND W/ MICROALB/CREAT RATIO (Completed)    3.  Essential hypertension     Relevant Orders     METABOLIC PANEL, BASIC      Acute Diagnoses Addressed Today     Screening PSA (prostate specific antigen)            Relevant Orders        PSA, DIAGNOSTIC (PROSTATE SPECIFIC AG)    Complaints of memory disturbance            Relevant Orders        TSH 3RD GENERATION        We discussed a screening exam PSA and the  the pros and cons of having the test done. The patient made a decision to do the test: yes  Okay PSA  Diabetes borderline discussed may be increasing metformin versus adding Jardiance. He will think about it and will discuss when he follows up. Lab work done to [facility] issues. Labs from today were reviewed yes, labs done previously were reviewed  yes, Labs done in ER were reviewed  no, Additional labs are ordered  yes,    Follow-up and Dispositions    · Return in about 3 months (around 10/14/2020) for routine follow up.

## 2020-09-01 ENCOUNTER — TELEPHONE (OUTPATIENT)
Dept: INTERNAL MEDICINE CLINIC | Age: 55
End: 2020-09-01

## 2020-09-01 NOTE — TELEPHONE ENCOUNTER
----- Message from [employer] sent at 9/1/2020 10:10 AM EDT -----  Regarding: /Refill  Contact: [phone]  Caller (if not patient):self  Relationship of caller (if not patient):self  Best contact number(s): [phone]   Name of medication and dosage if known: \"indomethacin\" 25mgs  Is patient out of this medication (yes/no):yes  Pharmacy name:[facility] in [address] [place] [address] listed in chart? (yes/no):yes  Pharmacy phone [phone]  Date of last visit:7/14/2020  Details to clarify the request: Pt.needs  to prescribe him this medication. Pt. used to get this medication from his previous doctor for gout,from [facility] in [place] before switching to [facility].

## 2020-09-02 RX ORDER — INDOMETHACIN 25 MG/1
25 CAPSULE ORAL 3 TIMES DAILY
Qty: 60 CAP | Refills: 1 | Status: SHIPPED | OUTPATIENT
Start: 2020-09-02 | End: 2020-11-10 | Stop reason: SDUPTHER

## 2020-09-02 NOTE — TELEPHONE ENCOUNTER
Sent to Dr. Zoë Baca,  He want his indomethancin 25mg sent to [facility] in [place] is possible

## 2020-09-30 ENCOUNTER — VIRTUAL VISIT (OUTPATIENT)
Dept: SLEEP MEDICINE | Age: 55
End: 2020-09-30

## 2020-09-30 ENCOUNTER — DOCUMENTATION ONLY (OUTPATIENT)
Dept: SLEEP MEDICINE | Age: 55
End: 2020-09-30

## 2020-09-30 DIAGNOSIS — G47.33 OSA (OBSTRUCTIVE SLEEP APNEA): Primary | ICD-10-CM

## 2020-09-30 DIAGNOSIS — I10 ESSENTIAL HYPERTENSION: ICD-10-CM

## 2020-09-30 PROCEDURE — 99203 OFFICE O/P NEW LOW 30 MIN: CPT | Performed by: INTERNAL MEDICINE

## 2020-09-30 RX ORDER — NAPROXEN 250 MG/1
TABLET ORAL 2 TIMES DAILY WITH MEALS
COMMUNITY
End: 2020-11-10 | Stop reason: ALTCHOICE

## 2020-09-30 RX ORDER — ACETAMINOPHEN 325 MG/1
650 TABLET ORAL
COMMUNITY

## 2020-09-30 NOTE — PATIENT INSTRUCTIONS
217 [facility] [place]. 1668 [facility], [address] Tel.  [phone] Fax. 100 [facility] [address] [place], [address] [facility] Tel.  [phone] Fax. [phone] [address] Tel.  [phone] Fax. [phone] Sleep Apnea: After Your Visit Your Care Instructions Sleep apnea occurs when you frequently stop breathing for 10 seconds or longer during sleep. It can be mild to severe, based on the number of times per hour that you stop breathing or have slowed breathing. Blocked or narrowed airways in your nose, mouth, or throat can cause sleep apnea. Your airway can become blocked when your throat muscles and tongue relax during sleep. Sleep apnea is common, occurring in 1 out of 20 individuals. Individuals having any of the following characteristics should be evaluated and treated right away due to high risk and detrimental consequences from untreated sleep apnea: 
1. Obesity 2. Congestive Heart failure 3. Atrial Fibrillation 4. Uncontrolled Hypertension 5. Type II Diabetes 6. Night-time Arrhythmias 7. Stroke 8. Pulmonary Hypertension 9. High-risk Driving Populations (pilots, truck drivers, etc.) 10. Patients Considering Weight-loss Surgery How do you know you have sleep apnea? You probably have sleep apnea if you answer 'yes' to 3 or more of the following questions: S - Have you been told that you Snore? T - Are you often Tired during the day? O - Has anyone Observed you stop breathing while sleeping? P- Do you have (or are being treated for) high blood Pressure? B - Are you obese (Body Mass Index > 35)? A - Is your Age 48years old or older? N - Is your Neck size greater than 16 inches? G - Are you male Gender? A sleep physician can prescribe a breathing device that prevents tissues in the throat from blocking your airway.  Or your doctor may recommend using a dental device (oral breathing device) to help keep your airway open. In some cases, surgery may be needed to remove enlarged tissues in the throat. Follow-up care is a key part of your treatment and safety. Be sure to make and go to all appointments, and call your doctor if you are having problems. It's also a good idea to know your test results and keep a list of the medicines you take. How can you care for yourself at home? · Lose weight, if needed. It may reduce the number of times you stop breathing or have slowed breathing. · Go to bed at the same time every night. · Sleep on your side. It may stop mild apnea. If you tend to roll onto your back, sew a pocket in the back of your pajama top. Put a tennis ball into the pocket, and stitch the pocket shut. This will help keep you from sleeping on your back. · Avoid alcohol and medicines such as sleeping pills and sedatives before bed. · Do not smoke. Smoking can make sleep apnea worse. If you need help quitting, talk to your doctor about stop-smoking programs and medicines. These can increase your chances of quitting for good. · Prop up the head of your bed 4 to 6 inches by putting bricks under the legs of the bed. · Treat breathing problems, such as a stuffy nose, caused by a cold or allergies. · Use a continuous positive airway pressure (CPAP) breathing machine if lifestyle changes do not help your apnea and your doctor recommends it. The machine keeps your airway from closing when you sleep. · If CPAP does not help you, ask your doctor whether you should try other breathing machines. A bilevel positive airway pressure machine has two types of air pressureâone for breathing in and one for breathing out. Another device raises or lowers air pressure as needed while you breathe. · If your nose feels dry or bleeds when using one of these machines, talk with your doctor about increasing moisture in the air. A humidifier may help.  
· If your nose is runny or stuffy from using a breathing machine, talk with your doctor about using decongestants or a corticosteroid nasal spray. When should you call for help? Watch closely for changes in your health, and be sure to contact your doctor if: 
· You still have sleep apnea even though you have made lifestyle changes. · You are thinking of trying a device such as CPAP. · You are having problems using a CPAP or similar machine. Where can you learn more? Go to [place]. Enter G447 in the search box to learn more about \"Sleep Apnea: After Your Visit. \"  
© 4869-1952 Healthwise, Incorporated. Care instructions adapted under license by [facility] (which disclaims liability or warranty for this information). This care instruction is for use with your licensed healthcare professional. If you have questions about a medical condition or this instruction, always ask your healthcare professional. Flavio Spikes any warranty or liability for your use of this information. PROPER SLEEP HYGIENE What to avoid · Do not have drinks with caffeine, such as coffee or black tea, for 8 hours before bed. · Do not smoke or use other types of tobacco near bedtime. Nicotine is a stimulant and can keep you awake. · Avoid drinking alcohol late in the evening, because it can cause you to wake in the middle of the night. · Do not eat a big meal close to bedtime. If you are hungry, eat a light snack. · Do not drink a lot of water close to bedtime, because the need to urinate may wake you up during the night. · Do not read or watch TV in bed. Use the bed only for sleeping and sexual activity. What to try · Go to bed at the same time every night, and wake up at the same time every morning. Do not take naps during the day. · Keep your bedroom quiet, dark, and cool. · Get regular exercise, but not within 3 to 4 hours of your bedtime. Owen Escalante · Sleep on a comfortable pillow and mattress. · If watching the clock makes you anxious, turn it facing away from you so you cannot see the time. · If you worry when you lie down, start a worry book. Well before bedtime, write down your worries, and then set the book and your concerns aside. · Try meditation or other relaxation techniques before you go to bed. · If you cannot fall asleep, get up and go to another room until you feel sleepy. Do something relaxing. Repeat your bedtime routine before you go to bed again. · Make your house quiet and calm about an hour before bedtime. Turn down the lights, turn off the TV, log off the computer, and turn down the volume on music. This can help you relax after a busy day. Drowsy Driving The [employer] cites drowsiness as a causing factor in more than 277,464 police reported crashes annually, resulting in 76,000 injuries and 1,500 deaths. Other surveys suggest 55% of people polled have driven while drowsy in the past year, 23% had fallen asleep but not crashed, 3% crashed, and 2% had and accident due to drowsy driving. Who is at risk? Young Drivers: One study of drowsy driving accidents states that 55% of the drivers were under 25 years. Of those, 75% were male. Shift Workers and Travelers: People who work overnight or travel across time zones frequently are at higher risk of experiencing Circadian Rhythm Disorders. They are trying to work and function when their body is programed to sleep. Sleep Deprived: Lack of sleep has a serious impact on your ability to pay attention or focus on a task. Consistently getting less than the average of 8 hours your body needs creates partial or cumulative sleep deprivation. Untreated Sleep Disorders: Sleep Apnea, Narcolepsy, R.L.S., and other sleep disorders (untreated) prevent a person from getting enough restful sleep.  This leads to excessive daytime sleepiness and increases the risk for drowsy driving accidents by up to 7 times. Medications / Alcohol: Even over the counter medications can cause drowsiness. Medications that impair a drivers attention should have a warning label. Alcohol naturally makes you sleepy and on its own can cause accidents. Combined with excessive drowsiness its effects are amplified. Signs of Drowsy Driving: * You don't remember driving the last few miles * You may drift out of your alirio * You are unable to focus and your thoughts wander * You may yawn more often than normal 
 * You have difficulty keeping your eyes open / nodding off * Missing traffic signs, speeding, or tailgating Prevention-  
Good sleep hygiene, lifestyle and behavioral choices have the most impact on drowsy driving. There is no substitute for sleep and the average person requires 8 hours nightly. If you find yourself driving drowsy, stop and sleep. Consider the sleep hygiene tips provided during your visit as well. Medication Refill Policy: Refills for all medications require 1 week advance notice. Please have your pharmacy fax a refill request. We are unable to fax, or call in \"controled substance\" medications and you will need to pick these prescriptions up from our office. [employer] Activation Thank you for requesting access to [employer]. Please follow the instructions below to securely access and download your online medical record. [employer] allows you to send messages to your doctor, view your test results, renew your prescriptions, schedule appointments, and more. How Do I Sign Up? 1. In your internet browser, go to [email or web address]. 2. Click on the First Time User? Click Here link in the Sign In box. You will see the New Member Sign Up page. 3. Enter your [employer] Access Code exactly as it appears below. You will not need to use this code after youve completed the sign-up process.  If you do not sign up before the expiration date, you must request a new code. [employer] Access Code: Activation code not generated Current [employer] Status: Active (This is the date your [employer] access code will ) 4. Enter the last four digits of your Social Security Number (xxxx) and Date of Birth (mm/dd/yyyy) as indicated and click Submit. You will be taken to the next sign-up page. 5. Create a [employer] ID. This will be your [employer] login ID and cannot be changed, so think of one that is secure and easy to remember. 6. Create a [employer] password. You can change your password at any time. 7. Enter your Password Reset Question and Answer. This can be used at a later time if you forget your password. 8. Enter your e-mail address. You will receive e-mail notification when new information is available in [address] 9. Click Sign Up. You can now view and download portions of your medical record. 10. Click the Download Summary menu link to download a portable copy of your medical information. Additional Information If you have questions, please call [phone]. Remember, [employer] is NOT to be used for urgent needs. For medical emergencies, dial 911.

## 2020-09-30 NOTE — PROGRESS NOTES
[address] [facility] [address] [place]. [place], [address]  Tel.  [phone]  Fax. 100 [facility] 60  [place], 200 [address] [facility]  Tel.  [phone]  Fax. [phone] 9250 [facility] Chaim Garcia  Tel.  [phone]  Fax. [phone]         Subjective:    Ben Grimaldo is a 48 y.o. male who was seen by synchronous (real-time) audio-video technology on 9/30/2020. Consent:  He is aware that this patient-initiated Telehealth encounter is a billable service, with coverage as determined by his insurance carrier. He is aware that he may receive a bill and has provided verbal consent to proceed: Yes    I was at home while conducting this encounter. Patient verified with 's license. He complains of snoring associated with twitching of legs and feet along with vivid dreaming. Symptoms began several years ago, he was diagnosed with ALLAN and has bee on PAP therpay since that time. He usually can fall asleep in 5 minutes. Family or house members note snoring. He denies completely or partially paralyzed while falling asleep or waking up. Ben Grimaldo does not wake up frequently at night. He is not bothered by waking up too early and left unable to get back to sleep. He actually sleeps about 7 hours at night and wakes up about 1-3 times during the night. He does not work shifts. Odette Villegas indicates he does get too little sleep at night. His bedtime is 11:59 pm. He awakens at 7:30 am. He does not take naps. He has the following observed behaviors: Loud snoring, Light snoring, Twitching of legs or feet;  . Other remarks: vivid dreams     Currently using REM Star Auto 12-18 cmH2O (adherence 100%). [place] Sleepiness Score: 3   and Modified F.O.S.Q. Score Total / 2: 19.5       No Known Allergies      Current Outpatient Medications:     multivitamin, tx-iron-ca-min (THERA-M w/ IRON) 9 mg iron-400 mcg tab tablet, Take 1 Tab by mouth daily. , Disp: , Rfl:    acetaminophen (TylenoL) 325 mg tablet, Take 650 mg by mouth every four (4) hours as needed for Pain., Disp: , Rfl:     naproxen (NAPROSYN) 250 mg tablet, Take  by mouth two (2) times daily (with meals). , Disp: , Rfl:     indomethacin (INDOCIN) 25 mg capsule, Take 1 Cap by mouth three (3) times daily. As needed, Disp: 60 Cap, Rfl: 1    lisinopriL (PRINIVIL, ZESTRIL) 20 mg tablet, Take 1 Tab by mouth daily. , Disp: 90 Tab, Rfl: 1    metFORMIN (GLUCOPHAGE) 500 mg tablet, Take 1 Tab by mouth two (2) times daily (with meals). , Disp: 180 Tab, Rfl: 3    ascorbic acid, vitamin C, (VITAMIN C) 250 mg tablet, Take  by mouth., Disp: , Rfl:      He  has a past medical history of Diabetes ([facility] [address] Headache, Hypertension, and Thyroid disease. He also has no past medical history of Calculus of kidney, Chronic pain, Depression, Psychotic disorder ([facility] [address] or Seizures ([facility] [address] He  has a past surgical history that includes hx colonoscopy; hx endoscopy; pr cardiac surg procedure unlist; and hx orthopaedic. He family history is not on file. He  reports that he has been smoking cigars. He has never used smokeless tobacco. He reports current alcohol use of about 1.0 standard drinks of alcohol per week. He reports that he does not use drugs.      Review of Systems:  Constitutional:  No significant weight loss or weight gain  Eyes:  No blurred vision  CVS:  No significant chest pain  Pulm:  No significant shortness of breath  GI:  No significant nausea or vomiting  :  No significant nocturia  Musculoskeletal:  No significant joint pain at night  Skin:  No significant rashes  Neuro:  No significant dizziness   Psych:  No active mood issues    Sleep Review of Systems: notable for no difficulty falling asleep; infrequent awakenings at night;  regular dreaming noted; no nightmares ; no early morning headaches; no memory problems; no concentration issues; no history of any automobile or occupational accidents due to daytime drowsiness. Objective:     Patient-Reported Vitals 9/30/2020   Patient-Reported Weight 370   Patient-Reported Pulse 89   Patient-Reported Temperature 97.8   Patient-Reported Systolic  237   Patient-Reported Diastolic 80       Physical Exam completed by visual and auditory observation of patient with verbal input from patient. General:   Alert, oriented, not in acute distress   Eyes:  Anicteric Sclerae; no obvious strabismus   Nose:  No obvious nasal septum deviation    Neck:   Midline trachea, no visible mass   Chest/Lungs:  Respiratory effort normal, no visualized signs of difficulty breathing or respiratory distress   CVS:  No JVD   Extremities:  No obvious rashes noted on face, neck, or hands   Neuro:  No facial asymmetry, no focal deficits; no obvious tremor    Psych:  Normal affect,  normal countenance       Assessment:       ICD-10-CM ICD-9-CM    1. ALLAN (obstructive sleep apnea)  G47.33 327.23 SLEEP STUDY UNATTENDED, 4 CHANNEL   2. Essential hypertension  I10 401.9    3. BMI 50.0-59.9, adult [facility])  Z68.43 V85.43          Plan:        Sleep testing was ordered for initial evaluation as prior test results are not available.  He was provided information on sleep apnea including coresponding risk factors and the importance of proper treatment.  Treatment options if indicated were reviewed today. Patient agrees to a trial of PAP therapy (new device 12-18 cmH2O) if indicated.  Counseling was provided regarding proper sleep hygiene, appropriate sleep schedule, need for sleep environment safety and safe driving.  Recommended a dedicated weight loss program through appropriate diet and exercise regimen as significant weight reduction has been shown to reduce severity of obstructive sleep apnea. Patient's phone number [phone] (home)  was reviewed and confirmed for accuracy. He gives permission for messages regarding results and appointments to be left at that number.     Pursuant to the emergency declaration under the [facility], [facility] waiver authority and the [employer] Act, this Virtual Visit was conducted, with patient's consent, to reduce the patient's risk of exposure to COVID-19 and provide continuity of care for an established patient. Services were provided through a video synchronous discussion virtually to substitute for in-person clinic visit. Dima Mcmahon MD, [facility]  Electronically signed.  09/30/20

## 2020-10-08 ENCOUNTER — HOSPITAL ENCOUNTER (OUTPATIENT)
Dept: SLEEP MEDICINE | Age: 55
Discharge: HOME | End: 2020-10-08
Payer: COMMERCIAL

## 2020-10-08 PROCEDURE — 95806 SLEEP STUDY UNATT&RESP EFFT: CPT | Performed by: INTERNAL MEDICINE

## 2020-10-13 ENCOUNTER — TELEPHONE (OUTPATIENT)
Dept: SLEEP MEDICINE | Age: 55
End: 2020-10-13

## 2020-10-13 DIAGNOSIS — G47.33 OSA (OBSTRUCTIVE SLEEP APNEA): Primary | ICD-10-CM

## 2020-10-13 NOTE — TELEPHONE ENCOUNTER
Results of Sleep Testing reviewed with patient who agrees to a trial of APAP therapy. PAP prescription and follow-up discussed with patient. Patient encouraged to call if there were any further questions regarding sleep symptoms. Encounter Diagnosis   Name Primary?  ALLAN (obstructive sleep apnea) Yes       Orders Placed This Encounter    AMB SUPPLY ORDER     Diagnosis: (G47.33) ALLAN (obstructive sleep apnea)  (primary encounter diagnosis)     Respironics DreamStation ( Unit - Auto Mode) / Heated Humidifier :    Positive Airway Pressure Therapy: Duration of need: 99 months. Auto - PAP: 12 - 18 cmH2O; Optistart enabled. Ramp Time: 30 Minutes; Flex: 2. Remote monitoring enrollment.  Nasal Cushion (Replace) 2 per month.  Nasal Interface Mask 1 every 3 months.  Headgear 1 every 6 months.  Filter(s) Disposable 2 per month.  Filter(s) Non-Disposable 1 every 6 months. [address] [facility] [address] for [employer] (Replace) 1 every 6 months.  Tubing with heating element 1 every 3 months. Perform Mask Fitting per patient preference and comfort - replace as above. Amanda Ramos MD, FAASM; NPI: [id number]  Electronically signed. 10/13/20

## 2020-11-05 LAB
BUN SERPL-MCNC: 10 MG/DL (ref 6–24)
BUN/CREAT SERPL: 15 (ref 9–20)
CALCIUM SERPL-MCNC: 9.4 MG/DL (ref 8.7–10.2)
CHLORIDE SERPL-SCNC: 99 MMOL/L (ref 96–106)
CO2 SERPL-SCNC: 26 MMOL/L (ref 20–29)
CREAT SERPL-MCNC: 0.66 MG/DL (ref 0.76–1.27)
EST. AVERAGE GLUCOSE BLD GHB EST-MCNC: 183 MG/DL
GLUCOSE SERPL-MCNC: 168 MG/DL (ref 65–99)
HBA1C MFR BLD: 8 % (ref 4.8–5.6)
POTASSIUM SERPL-SCNC: 5 MMOL/L (ref 3.5–5.2)
PSA SERPL-MCNC: 1.3 NG/ML (ref 0–4)
SODIUM SERPL-SCNC: 139 MMOL/L (ref 134–144)
TSH SERPL DL<=0.005 MIU/L-ACNC: 0.75 UIU/ML (ref 0.45–4.5)

## 2020-11-10 ENCOUNTER — OFFICE VISIT (OUTPATIENT)
Dept: INTERNAL MEDICINE CLINIC | Age: 55
End: 2020-11-10
Payer: COMMERCIAL

## 2020-11-10 VITALS
RESPIRATION RATE: 18 BRPM | TEMPERATURE: 97.5 F | SYSTOLIC BLOOD PRESSURE: 120 MMHG | HEART RATE: 82 BPM | HEIGHT: 72 IN | WEIGHT: 315 LBS | BODY MASS INDEX: 42.66 KG/M2 | OXYGEN SATURATION: 96 % | DIASTOLIC BLOOD PRESSURE: 70 MMHG

## 2020-11-10 DIAGNOSIS — M10.00 IDIOPATHIC GOUT, UNSPECIFIED CHRONICITY, UNSPECIFIED SITE: ICD-10-CM

## 2020-11-10 DIAGNOSIS — E11.65 TYPE 2 DIABETES MELLITUS WITH HYPERGLYCEMIA, WITHOUT LONG-TERM CURRENT USE OF INSULIN (HCC): Primary | ICD-10-CM

## 2020-11-10 DIAGNOSIS — R53.83 FATIGUE, UNSPECIFIED TYPE: ICD-10-CM

## 2020-11-10 DIAGNOSIS — G47.33 OSA (OBSTRUCTIVE SLEEP APNEA): ICD-10-CM

## 2020-11-10 DIAGNOSIS — I10 ESSENTIAL HYPERTENSION: ICD-10-CM

## 2020-11-10 PROCEDURE — 99214 OFFICE O/P EST MOD 30 MIN: CPT | Performed by: FAMILY MEDICINE

## 2020-11-10 PROCEDURE — 3052F HG A1C>EQUAL 8.0%<EQUAL 9.0%: CPT | Performed by: FAMILY MEDICINE

## 2020-11-10 RX ORDER — LISINOPRIL 20 MG/1
20 TABLET ORAL DAILY
Qty: 90 TAB | Refills: 3 | Status: SHIPPED | OUTPATIENT
Start: 2020-11-10 | End: 2021-03-03

## 2020-11-10 RX ORDER — ALLOPURINOL 100 MG/1
100 TABLET ORAL DAILY
Qty: 30 TAB | Refills: 5 | Status: SHIPPED | OUTPATIENT
Start: 2020-11-10 | End: 2021-05-03

## 2020-11-10 RX ORDER — INDOMETHACIN 25 MG/1
25 CAPSULE ORAL 3 TIMES DAILY
Qty: 60 CAP | Refills: 1 | Status: SHIPPED | OUTPATIENT
Start: 2020-11-10 | End: 2022-05-05 | Stop reason: ALTCHOICE

## 2020-11-10 RX ORDER — EMPAGLIFLOZIN AND METFORMIN HYDROCHLORIDE 5; 500 MG/1; MG/1
1 TABLET ORAL 2 TIMES DAILY WITH MEALS
Qty: 60 TAB | Refills: 5 | Status: SHIPPED | OUTPATIENT
Start: 2020-11-10 | End: 2021-05-03

## 2020-11-10 NOTE — PROGRESS NOTES
Chief Complaint   Patient presents with    Hypertension            Health Maintenance reviewed. I have reviewed the patient's medical history in detail and updated the computerized patient record. 1. Have you been to the ER, urgent care clinic since your last visit? Hospitalized since your last visit?no    2. Have you seen or consulted any other health care providers outside of the [address] [place] [address] [place] since your last visit? Include any pap smears or colon screening. no    Patient has not been out of the country in (6-7 months), NO diarrhea, NO cough, NO chest conjestion, NO temp. Pt has not been around anyone with these symptoms. Encouraged pt to discuss pt's wishes with spouse/partner/family and bring them in the next appt to follow thru with the Advanced Directive    Fall Risk Assessment, last 12 mths 11/10/2020   Able to walk? Yes   Fall in past 12 months? No       3 most recent PHQ Screens 11/10/2020   Little interest or pleasure in doing things Several days   Feeling down, depressed, irritable, or hopeless Several days   Total Score PHQ 2 2       Abuse Screening Questionnaire 11/10/2020   Do you ever feel afraid of your partner? N   Are you in a relationship with someone who physically or mentally threatens you? N   Is it safe for you to go home?  Y       ADL Assessment 11/10/2020   Feeding yourself No Help Needed   Getting from bed to chair No Help Needed   Getting dressed No Help Needed   Bathing or showering No Help Needed   Walk across the room (includes cane/walker) No Help Needed   Using the telphone No Help Needed   Taking your medications No Help Needed   Preparing meals No Help Needed   Managing money (expenses/bills) No Help Needed   Moderately strenuous housework (laundry) No Help Needed   Shopping for personal items (toiletries/medicines) No Help Needed   Shopping for groceries No Help Needed   Driving No Help Needed   Climbing a flight of stairs No Help Needed   Getting to places beyond walking distances No Help Needed

## 2020-11-10 NOTE — PROGRESS NOTES
Subjective:     Deon Arriola is a 48 y.o. male seen for follow-up of diabetes. He has had hypoglycemic attacks. .no  Blood sugar control has been ok  He has diabetes and hypertension. Lab Results   Component Value Date/Time    Hemoglobin A1c 8.0 (H) 11/04/2020 10:08 AM    Hemoglobin A1c 8.4 (H) 06/24/2020 01:06 PM    Glucose 168 (H) 11/04/2020 10:08 AM    Microalb/Creat ratio (ug/mg creat.) 11 07/14/2020 01:20 PM    LDL, calculated 66 06/24/2020 01:06 PM    Creatinine 0.66 (L) 11/04/2020 10:08 AM       Deon Arriola has the additional concern of feels OK. Did a at home sleep study with Maryjane. Getting a new machine. Gout better. occa      Diabetic Review of Systems: no polyuria or polydipsia, no chest pain, dyspnea or TIA's, no numbness, tingling or pain in extremities, no hypoglycemia, no medication side effects noted. No Known Allergies    Diet and Lifestyle: follows a diabetic diet regularly, nonsmoker. Patient Active Problem List    Diagnosis Date Noted    Obesity, morbid ([facility] [address] 02/18/2020    Hyperglycemia due to type 2 diabetes mellitus ([facility] [address] 02/18/2020    Essential hypertension 02/18/2020     No Known Allergies  Social History     Tobacco Use    Smoking status: Current Some Day Smoker     Types: Cigars    Smokeless tobacco: Never Used    Tobacco comment: 2 per mo cigar   Substance Use Topics    Alcohol use: Yes     Alcohol/week: 1.0 standard drinks     Types: 1 Shots of liquor per week     Comment: occ             Review of Systems  Pertinent items are noted in HPI. Objective:     Significant for the following:     Visit Vitals  BP (!) 135/96   Pulse 82   Temp 97.5 °F (36.4 °C) (Temporal)   Resp 18   Ht 6' (1.829 m)   Wt (!) 370 lb (167.8 kg)   SpO2 96%   BMI 50.18 kg/m²     Appearance: alert, well appearing, and in no distress and overweight.   Exam: heart sounds normal rate, regular rhythm, normal S1, S2, no murmurs, rubs, clicks or gallops, chest clear  Foot exam: Deferred    Lab review: labs reviewed, I note that glycosylated hemoglobin mildly abnormal but acceptable. Assessment/Plan:     Follow-up diabetes stable, borderline controlled, needs improvement, needs to follow diet more regularly. Diabetic issues reviewed with him: all medications, side effects and compliance discussed carefully. Chronic Conditions Addressed Today     1. Essential hypertension     Relevant Medications     lisinopriL (PRINIVIL, ZESTRIL) 20 mg tablet     Other Relevant Orders     METABOLIC PANEL, BASIC    2. Hyperglycemia due to type 2 diabetes mellitus (HCC) - Primary     Relevant Medications     empagliflozin-metFORMIN (Synjardy) 5-500 mg per tablet     lisinopriL (PRINIVIL, ZESTRIL) 20 mg tablet     Other Relevant Orders     HEMOGLOBIN A1C WITH EAG      Acute Diagnoses Addressed Today     Idiopathic gout, unspecified chronicity, unspecified site            Relevant Medications        allopurinoL (ZYLOPRIM) 100 mg tablet        indomethacin (INDOCIN) 25 mg capsule        Other Relevant Orders        URIC ACID    Fatigue, unspecified type            Relevant Orders        TSH 3RD GENERATION    ALLAN (obstructive sleep apnea)            Current Outpatient Medications   Medication Sig Dispense Refill    empagliflozin-metFORMIN (Synjardy) 5-500 mg per tablet Take 1 Tab by mouth two (2) times daily (with meals). 60 Tab 5    allopurinoL (ZYLOPRIM) 100 mg tablet Take 1 Tab by mouth daily. 30 Tab 5    lisinopriL (PRINIVIL, ZESTRIL) 20 mg tablet Take 1 Tab by mouth daily. 90 Tab 3    indomethacin (INDOCIN) 25 mg capsule Take 1 Cap by mouth three (3) times daily. As needed 60 Cap 1    multivitamin, tx-iron-ca-min (THERA-M w/ IRON) 9 mg iron-400 mcg tab tablet Take 1 Tab by mouth daily.  acetaminophen (TylenoL) 325 mg tablet Take 650 mg by mouth every four (4) hours as needed for Pain.  ascorbic acid, vitamin C, (VITAMIN C) 250 mg tablet Take  by mouth.          Discussed possible side affects, precautions, and drug interactions and possible benefits of the medication(s). Follow-up and Dispositions    · Return in about 5 months (around 4/10/2021) for routine follow up.

## 2020-11-10 NOTE — LETTER
11/10/2020 Mr. Analy De Leon [address]  [place] [id number] Dear Analy De Leon: 
 
Please find your most recent results below. Resulted Orders METABOLIC PANEL, BASIC (Collected: 11/4/2020 10:08 AM) Result Value Ref Range Glucose 168 (H) 65 - 99 mg/dL BUN 10 6 - 24 mg/dL Creatinine 0.66 (L) 0.76 - 1.27 mg/dL GFR est non- >59 mL/min/1.73 GFR est  >59 mL/min/1.73  
 BUN/Creatinine ratio 15 9 - 20 Sodium 139 134 - 144 mmol/L Potassium 5.0 3.5 - 5.2 mmol/L Chloride 99 96 - 106 mmol/L  
 CO2 26 20 - 29 mmol/L Calcium 9.4 8.7 - 10.2 mg/dL Narrative Performed at:  [address]  [id number] : Josey Aponte MD, Phone:  [phone] HEMOGLOBIN A1C WITH EAG (Collected: 11/4/2020 10:08 AM) Result Value Ref Range Hemoglobin A1c 8.0 (H) 4.8 - 5.6 % Comment:  
            Prediabetes: 5.7 - 6.4 Diabetes: >6.4 Glycemic control for adults with diabetes: <7.0 Estimated average glucose 183 mg/dL Narrative Performed at:  [address]  [id number] : Josey Aponte MD, Phone:  [phone] TSH 3RD GENERATION (Collected: 11/4/2020 10:08 AM) Result Value Ref Range TSH 0.749 0.450 - 4.500 uIU/mL Narrative Performed at:  [address]  [id number] : Josey Aponte MD, Phone:  [phone] PSA, DIAGNOSTIC (PROSTATE SPECIFIC AG) (Collected: 11/4/2020 10:08 AM) Result Value Ref Range Prostate Specific Ag 1.3 0.0 - 4.0 ng/mL Comment:  
   Roche ECLIA methodology. According to the American Urological Association, Serum PSA should 
decrease and remain at undetectable levels after radical 
prostatectomy. The AUA defines biochemical recurrence as an initial 
PSA value 0.2 ng/mL or greater followed by a subsequent confirmatory PSA value 0.2 ng/mL or greater. Values obtained with different assay methods or kits cannot be used 
interchangeably. Results cannot be interpreted as absolute evidence 
of the presence or absence of malignant disease. Narrative Performed at:  [address]  [id number] : Karly Vee MD, Phone:  [phone] RECOMMENDATIONS: 
Work on diet and exercise. Your diabetes is not as good as we like but it's better. Please continue your current medications and vigorously follow your diabetic diet. No change in your medications for now. Please call me if you have any questions: [phone] Sincerely, Cody Ruiz MD

## 2020-11-10 NOTE — PATIENT INSTRUCTIONS
Purine-Restricted Diet: Care Instructions Your Care Instructions Purines are substances that are found in some foods. Your body turns purines into uric acid. High levels of uric acid can cause gout, which is a form of arthritis that causes pain and inflammation in joints. You may be able to help control the amount of uric acid in your body by limiting high-purine foods in your diet. Follow-up care is a key part of your treatment and safety. Be sure to make and go to all appointments, and call your doctor if you are having problems. It's also a good idea to know your test results and keep a list of the medicines you take. How can you care for yourself at home? · Plan your meals and snacks around foods that are low in purines and are safe for you to eat. These foods include: ? Green vegetables and tomatoes. ? Fruits. ? Whole-grain breads, rice, and cereals. ? Eggs, peanut butter, and nuts. ? Low-fat milk, cheese, and other milk products. ? Popcorn. ? Gelatin desserts, chocolate, cocoa, and cakes and sweets, in small amounts. · You can eat certain foods that are medium-high in purines, but eat them only once in a while. These foods include: 
? Legumes, such as dried beans and dried peas. You can have 1 cup cooked legumes each day. ? Asparagus, cauliflower, spinach, mushrooms, and green peas. ? Fish and seafood (other than very high-purine seafood). ? Oatmeal, wheat bran, and wheat germ. · Limit very high-purine foods, including: 
? Organ meats, such as liver, kidneys, sweetbreads, and brains. ? Meats, including perera, beef, pork, and lamb. ? Game meats and any other meats in large amounts. ? Anchovies, sardines, herring, mackerel, and scallops. ? Gravy. ? Beer. Where can you learn more? Go to http://www.gray.com/ Enter F448 in the search box to learn more about \"Purine-Restricted Diet: Care Instructions. \" Current as of: August 22, 2019               Content Version: 12.6 © 4495-1324 [employer]. Care instructions adapted under license by [employer] (which disclaims liability or warranty for this information). If you have questions about a medical condition or this instruction, always ask your healthcare professional. [address] any warranty or liability for your use of this information. Learning About Diabetes Food Guidelines Your Care Instructions Meal planning is important to manage diabetes. It helps keep your blood sugar at a target level (which you set with your doctor). You don't have to eat special foods. You can eat what your family eats, including sweets once in a while. But you do have to pay attention to how often you eat and how much you eat of certain foods. You may want to work with a dietitian or a certified diabetes educator (CDE) to help you plan meals and snacks. A dietitian or CDE can also help you lose weight if that is one of your goals. What should you know about eating carbs? Managing the amount of carbohydrate (carbs) you eat is an important part of healthy meals when you have diabetes. Carbohydrate is found in many foods. · Learn which foods have carbs. And learn the amounts of carbs in different foods. ? Bread, cereal, pasta, and rice have about 15 grams of carbs in a serving. A serving is 1 slice of bread (1 ounce), ½ cup of cooked cereal, or 1/3 cup of cooked pasta or rice. ? Fruits have 15 grams of carbs in a serving. A serving is 1 small fresh fruit, such as an apple or orange; ½ of a banana; ½ cup of cooked or canned fruit; ½ cup of fruit juice; 1 cup of melon or raspberries; or 2 tablespoons of dried fruit. ? Milk and no-sugar-added yogurt have 15 grams of carbs in a serving. A serving is 1 cup of milk or 2/3 cup of no-sugar-added yogurt. ? Starchy vegetables have 15 grams of carbs in a serving.  A serving is ½ cup of mashed potatoes or sweet potato; 1 cup winter squash; ½ of a small baked potato; ½ cup of cooked beans; or ½ cup cooked corn or green peas. · Learn how much carbs to eat each day and at each meal. A dietitian or CDE can teach you how to keep track of the amount of carbs you eat. This is called carbohydrate counting. · If you are not sure how to count carbohydrate grams, use the Plate Method to plan meals. It is a good, quick way to make sure that you have a balanced meal. It also helps you spread carbs throughout the day. ? Divide your plate by types of foods. Put non-starchy vegetables on half the plate, meat or other protein food on one-quarter of the plate, and a grain or starchy vegetable in the final quarter of the plate. To this you can add a small piece of fruit and 1 cup of milk or yogurt, depending on how many carbs you are supposed to eat at a meal. 
· Try to eat about the same amount of carbs at each meal. Do not \"save up\" your daily allowance of carbs to eat at one meal. 
· Proteins have very little or no carbs per serving. Examples of proteins are beef, chicken, turkey, fish, eggs, tofu, cheese, cottage cheese, and peanut butter. A serving size of meat is 3 ounces, which is about the size of a deck of cards. Examples of meat substitute serving sizes (equal to 1 ounce of meat) are 1/4 cup of cottage cheese, 1 egg, 1 tablespoon of peanut butter, and ½ cup of tofu. How can you eat out and still eat healthy? · Learn to estimate the serving sizes of foods that have carbohydrate. If you measure food at home, it will be easier to estimate the amount in a serving of restaurant food. · If the meal you order has too much carbohydrate (such as potatoes, corn, or baked beans), ask to have a low-carbohydrate food instead. Ask for a salad or green vegetables. · If you use insulin, check your blood sugar before and after eating out to help you plan how much to eat in the future.  
· If you eat more carbohydrate at a meal than you had planned, take a walk or do other exercise. This will help lower your blood sugar. What else should you know? · Limit saturated fat, such as the fat from meat and dairy products. This is a healthy choice because people who have diabetes are at higher risk of heart disease. So choose lean cuts of meat and nonfat or low-fat dairy products. Use olive or canola oil instead of butter or shortening when cooking. · Don't skip meals. Your blood sugar may drop too low if you skip meals and take insulin or certain medicines for diabetes. · Check with your doctor before you drink alcohol. Alcohol can cause your blood sugar to drop too low. Alcohol can also cause a bad reaction if you take certain diabetes medicines. Follow-up care is a key part of your treatment and safety. Be sure to make and go to all appointments, and call your doctor if you are having problems. It's also a good idea to know your test results and keep a list of the medicines you take. Where can you learn more? Go to http://www.gray.com/ Enter Z958 in the search box to learn more about \"Learning About Diabetes Food Guidelines. \" Current as of: December 20, 2019               Content Version: 12.6 © 3963-8750 [employer]. Care instructions adapted under license by [employer] (which disclaims liability or warranty for this information). If you have questions about a medical condition or this instruction, always ask your healthcare professional. [address] any warranty or liability for your use of this information.

## 2020-11-11 NOTE — PROGRESS NOTES
Stable results. Diabetes is stable, blood sugars run higher than we like, take new medicine regiment as we discussed. Please follow diabetes diet as well. COUGH

## 2020-11-28 LAB
SPECIMEN STATUS REPORT, ROLRST: NORMAL
TSH SERPL DL<=0.005 MIU/L-ACNC: 0.77 UIU/ML (ref 0.45–4.5)
URATE SERPL-MCNC: 6.5 MG/DL (ref 3.7–8.6)

## 2021-01-13 ENCOUNTER — VIRTUAL VISIT (OUTPATIENT)
Dept: SLEEP MEDICINE | Age: 56
End: 2021-01-13
Payer: COMMERCIAL

## 2021-01-13 DIAGNOSIS — I10 ESSENTIAL HYPERTENSION: ICD-10-CM

## 2021-01-13 DIAGNOSIS — G47.33 OSA (OBSTRUCTIVE SLEEP APNEA): Primary | ICD-10-CM

## 2021-01-13 PROCEDURE — 99213 OFFICE O/P EST LOW 20 MIN: CPT | Performed by: INTERNAL MEDICINE

## 2021-01-13 NOTE — PATIENT INSTRUCTIONS
[address] [place]. [address] [facility], [address] Tel.  [phone] Fax. 100 [facility] 60 [place], [address] [facility] Tel.  [phone] Fax. [phone] [address] Tel.  [phone] Fax. [phone] Learning About CPAP for Sleep Apnea What is CPAP? CPAP is a small machine that you use at home every night while you sleep. It increases air pressure in your throat to keep your airway open. When you have sleep apnea, this can help you sleep better so you feel much better. CPAP stands for \"continuous positive airway pressure. \" The CPAP machine will have one of the following: · A mask that covers your nose and mouth · Prongs that fit into your nose · A mask that covers your nose only, the most common type. This type is called NCPAP. The N stands for \"nasal.\" Why is it done? CPAP is usually the best treatment for obstructive sleep apnea. It is the first treatment choice and the most widely used. Your doctor may suggest CPAP if you have: · Moderate to severe sleep apnea. · Sleep apnea and coronary artery disease (CAD) or heart failure. How does it help? · CPAP can help you have more normal sleep, so you feel less sleepy and more alert during the daytime. · CPAP may help keep heart failure or other heart problems from getting worse. · NCPAP may help lower your blood pressure. · If you use CPAP, your bed partner may also sleep better because you are not snoring or restless. What are the side effects? Some people who use CPAP have: · A dry or stuffy nose and a sore throat. · Irritated skin on the face. · Sore eyes. · Bloating. If you have any of these problems, work with your doctor to fix them. Here are some things you can try: · Be sure the mask or nasal prongs fit well. · See if your doctor can adjust the pressure of your CPAP. · If your nose is dry, try a humidifier. · If your nose is runny or stuffy, try decongestant medicine or a steroid nasal spray. If these things do not help, you might try a different type of machine. Some machines have air pressure that adjusts on its own. Others have air pressures that are different when you breathe in than when you breathe out. This may reduce discomfort caused by too much pressure in your nose. Where can you learn more? Go to Ade[employer].be Enter C547 in the search box to learn more about \"Learning About CPAP for Sleep Apnea. \"  
© 5782-8290 Healthwise, Incorporated. Care instructions adapted under license by [facility] (which disclaims liability or warranty for this information). This care instruction is for use with your licensed healthcare professional. If you have questions about a medical condition or this instruction, always ask your healthcare professional. [address] any warranty or liability for your use of this information. Content Version: 2.6.22411; Last Revised: January 11, 2010 PROPER SLEEP HYGIENE What to avoid · Do not have drinks with caffeine, such as coffee or black tea, for 8 hours before bed. · Do not smoke or use other types of tobacco near bedtime. Nicotine is a stimulant and can keep you awake. · Avoid drinking alcohol late in the evening, because it can cause you to wake in the middle of the night. · Do not eat a big meal close to bedtime. If you are hungry, eat a light snack. · Do not drink a lot of water close to bedtime, because the need to urinate may wake you up during the night. · Do not read or watch TV in bed. Use the bed only for sleeping and sexual activity. What to try · Go to bed at the same time every night, and wake up at the same time every morning. Do not take naps during the day. · Keep your bedroom quiet, dark, and cool. · Get regular exercise, but not within 3 to 4 hours of your bedtime. Jonathan Morejon · Sleep on a comfortable pillow and mattress. 
· If watching the clock makes you anxious, turn it facing away from you so you cannot see the time. 
· If you worry when you lie down, start a worry book. Well before bedtime, write down your worries, and then set the book and your concerns aside. 
· Try meditation or other relaxation techniques before you go to bed. 
· If you cannot fall asleep, get up and go to another room until you feel sleepy. Do something relaxing. Repeat your bedtime routine before you go to bed again. 
· Make your house quiet and calm about an hour before bedtime. Turn down the lights, turn off the TV, log off the computer, and turn down the volume on music. This can help you relax after a busy day. 
 
Drowsy Driving: The [place] National Highway Traffic Safety Administration cites drowsiness as a causing factor in more than 100,000 police reported crashes annually, resulting in 76,000 injuries and 1,500 deaths. Other surveys suggest 55% of people polled have driven while drowsy in the past year, 23% had fallen asleep but not crashed, 3% crashed, and 2% had and accident due to drowsy driving. 
Who is at risk?  
Young Drivers: One study of drowsy driving accidents states that 55% of the drivers were under 25 years. Of those, 75% were male.  
Shift Workers and Travelers: People who work overnight or travel across time zones frequently are at higher risk of experiencing Circadian Rhythm Disorders. They are trying to work and function when their body is programed to sleep.  
Sleep Deprived: Lack of sleep has a serious impact on your ability to pay attention or focus on a task. Consistently getting less than the average of 8 hours your body needs creates partial or cumulative sleep deprivation.  
 Untreated Sleep Disorders: Sleep Apnea, Narcolepsy, R.L.S., and other sleep disorders (untreated) prevent a person from getting enough restful sleep. This leads to excessive daytime sleepiness and increases the risk for drowsy driving accidents by up to 7 times. Medications / Alcohol: Even over the counter medications can cause drowsiness. Medications that impair a drivers attention should have a warning label. Alcohol naturally makes you sleepy and on its own can cause accidents. Combined with excessive drowsiness its effects are amplified. Signs of Drowsy Driving: * You don't remember driving the last few miles * You may drift out of your alirio * You are unable to focus and your thoughts wander * You may yawn more often than normal 
 * You have difficulty keeping your eyes open / nodding off * Missing traffic signs, speeding, or tailgating Prevention-  
Good sleep hygiene, lifestyle and behavioral choices have the most impact on drowsy driving. There is no substitute for sleep and the average person requires 8 hours nightly. If you find yourself driving drowsy, stop and sleep. Consider the sleep hygiene tips provided during your visit as well. Medication Refill Policy: Refills for all medications require 1 week advance notice. Please have your pharmacy fax a refill request. We are unable to fax, or call in \"controled substance\" medications and you will need to pick these prescriptions up from our office. [employer]Wit Activation Thank you for requesting access to [employer]it. Please follow the instructions below to securely access and download your online medical record. [employer]Wit allows you to send messages to your doctor, view your test results, renew your prescriptions, schedule appointments, and more. How Do I Sign Up? 1. In your internet browser, go to [email or web address]. [employer]/"[email or web address]. 2. Click on the First Time User? Click Here link in the Sign In box. You will see the New Member Sign Up page. 3. Enter your [email or web address] Access Code exactly as it appears below. You will not need to use this code after youve completed the sign-up process. If you do not sign up before the expiration date, you must request a new code. MyChart Access Code: Activation code not generated Current [email or web address] Status: Active (This is the date your MyChart access code will ) 4. Enter the last four digits of your Social Security Number (xxxx) and Date of Birth (mm/dd/yyyy) as indicated and click Submit. You will be taken to the next sign-up page. 5. Create a [facility] ID. This will be your [email or web address] login ID and cannot be changed, so think of one that is secure and easy to remember. 6. Create a [email or web address] password. You can change your password at any time. 7. Enter your Password Reset Question and Answer. This can be used at a later time if you forget your password. 8. Enter your e-mail address. You will receive e-mail notification when new information is available in [address] 9. Click Sign Up. You can now view and download portions of your medical record. 10. Click the Download Summary menu link to download a portable copy of your medical information. Additional Information If you have questions, please call [phone]. Remember, [email or web address] is NOT to be used for urgent needs. For medical emergencies, dial 911.

## 2021-01-13 NOTE — PROGRESS NOTES
[address] [place]. [employer], [address]   Tel.  [phone]   Fax. [address]   [place], [address]   Tel.  [phone]   Fax. [phone] [address]   Tel.  [phone]   Fax. [address] (: 1965) is a 50 y.o. male, established patient, seen for positive airway pressure follow-up and evaluation of the following chief complaint(s):   Sleep Problem (VV [id number] text, 1st adherence)       Jerrell Frazier was last seen by me on 20, prior notes reviewed in detail. Home sleep test 10-08-20 showed AHI of 20.1/hr with a lowest SaO2 of 85%, duration of SaO2 < 88% 2.1 min. ASSESSMENT/PLAN:    ICD-10-CM ICD-9-CM    1. ALLAN (obstructive sleep apnea)  G47.33 327.23    2. Essential hypertension  I10 401.9    3. BMI 50.0-59.9, adult (HCC)  Z68.43 V85.43        AHI = 1.4. On Respironics :  12-18 cmH2O. Set up 1220. He is adherent with PAP therapy and PAP continues to benefit patient and remains necessary for control of his sleep apnea. 1. Sleep Apnea -  Continue on current pressures 112-18 cmH2O.    * Counseling was provided regarding the importance of regular PAP use with emphasis on ensuring sufficient total sleep time, proper sleep hygiene, and safe driving. * Re-enforced proper and regular cleaning for the device. * He was asked to contact our office for any problems regarding PAP therapy. 2. Recommended a dedicated weight loss program through appropriate diet and exercise regimen as significant weight reduction has been shown to reduce severity of obstructive sleep apnea. SUBJECTIVE/OBJECTIVE:    He  is seen today for follow up on PAP device and reports no problems using the device.    The following concerns identified:    Drowsiness no Problems exhaling no   Snoring no Forget to put on no   Mask Comfortable yes Can't fall asleep no   Dry Mouth no Mask falls off no   Air Leaking no Frequent awakenings no       He admits that his sleep has significantly improved on PAP therapy using nasal mask and heated tubing. Review of device download indicated:  95th percentile Pressure: 14.2 cmH2O   Average time in Large Leak:  13 minutes 12 secs  % Used Days >= 4 hours: 100%. Avg hours used:  9 hours 8 minutes. Therapy Apnea Index averaged over PAP use: 1.4 /hr which reflects improved sleep breathing condition. Atkinson Sleepiness Score: 3 and Modified F.O.S.Q. Score Total / 2: 20 which reflects improved sleep quality over therapy time. Sleep Review of Systems: notable for Negative difficulty falling asleep; Negative awakenings at night; Negative early morning headaches; Negative memory problems; Negative concentration issues; Negative chest pain; Negative shortness of breath; Negative significant joint pain at night; Negative significant muscle pain at night; Negative rashes or itching; Negative heartburn; Negative significant mood issues  Vitals reported by patient     Patient-Reported Vitals 1/13/2021   Patient-Reported Weight 375   Patient-Reported Pulse -   Patient-Reported Temperature -   Patient-Reported Systolic  -   Patient-Reported Diastolic -      Calculated BMI 50.18 kg/m2    Physical Exam completed by visual and auditory observation of patient with verbal input from patient.     General:   Alert, oriented, not in acute distress   Eyes:  Anicteric Sclerae; no obvious strabismus   Nose:  No obvious nasal septum deviation    Neck:   Midline trachea, no visible mass   Chest/Lungs:  Respiratory effort normal, no visualized signs of difficulty breathing or respiratory distress   CVS:  No JVD   Extremities:  No obvious rashes noted on face, neck, or hands   Neuro:  No facial asymmetry, no focal deficits; no obvious tremor    Psych:  Normal affect,  normal countenance     Larisa Tineo is being evaluated by a Virtual Visit (video visit) encounter to address concerns as mentioned above. A caregiver was present when appropriate. Due to this being a TeleHealth encounter (During ZBPZW-47 public health emergency), evaluation of the following organ systems was limited: Vitals/Constitutional/EENT/Resp/CV/GI//MS/Neuro/Skin/Heme-Lymph-Imm. Pursuant to the emergency declaration under the [address] [place] [address] and the [employer] and [employer] Act, this Virtual Visit was conducted with patient's (and/or legal guardian's) consent, to reduce the patient's risk of exposure to COVID-19 and provide necessary medical care. Patient identification was verified at the start of the visit: YES using name and date of birth. Patient's phone number [phone] (home)  was confirmed for accuracy. He gives permission for messages regarding results and appointments to be left at that number. Services were provided through a video synchronous discussion virtually to substitute for in-person clinic visit. I was at home while conducting this encounter, patient located at their home or alternate location of their choice. An electronic signature was used to authenticate this note. Amber Parisi MD, FAASM  Diplomate American Board of Sleep Medicine  Diplomate in Sleep Medicine - ABP    Electronically signed.  01/13/21

## 2021-03-03 DIAGNOSIS — I10 ESSENTIAL HYPERTENSION: ICD-10-CM

## 2021-03-03 RX ORDER — LISINOPRIL 20 MG/1
TABLET ORAL
Qty: 90 TAB | Refills: 3 | Status: SHIPPED | OUTPATIENT
Start: 2021-03-03 | End: 2022-05-02

## 2021-05-13 LAB
BUN SERPL-MCNC: 10 MG/DL (ref 6–24)
BUN/CREAT SERPL: 13 (ref 9–20)
CALCIUM SERPL-MCNC: 9.9 MG/DL (ref 8.7–10.2)
CHLORIDE SERPL-SCNC: 101 MMOL/L (ref 96–106)
CO2 SERPL-SCNC: 24 MMOL/L (ref 20–29)
CREAT SERPL-MCNC: 0.75 MG/DL (ref 0.76–1.27)
GLUCOSE SERPL-MCNC: 145 MG/DL (ref 65–99)
POTASSIUM SERPL-SCNC: 5.1 MMOL/L (ref 3.5–5.2)
SODIUM SERPL-SCNC: 140 MMOL/L (ref 134–144)
TSH SERPL DL<=0.005 MIU/L-ACNC: 0.62 UIU/ML (ref 0.45–4.5)
URATE SERPL-MCNC: 4.3 MG/DL (ref 3.8–8.4)

## 2021-05-28 ENCOUNTER — OFFICE VISIT (OUTPATIENT)
Dept: INTERNAL MEDICINE CLINIC | Age: 56
End: 2021-05-28
Payer: COMMERCIAL

## 2021-05-28 VITALS
SYSTOLIC BLOOD PRESSURE: 131 MMHG | HEART RATE: 86 BPM | OXYGEN SATURATION: 99 % | DIASTOLIC BLOOD PRESSURE: 88 MMHG | HEIGHT: 72 IN | WEIGHT: 315 LBS | RESPIRATION RATE: 20 BRPM | BODY MASS INDEX: 42.66 KG/M2 | TEMPERATURE: 98.2 F

## 2021-05-28 DIAGNOSIS — Z12.5 SCREENING PSA (PROSTATE SPECIFIC ANTIGEN): ICD-10-CM

## 2021-05-28 DIAGNOSIS — E66.01 OBESITY, MORBID (HCC): ICD-10-CM

## 2021-05-28 DIAGNOSIS — I10 ESSENTIAL HYPERTENSION: ICD-10-CM

## 2021-05-28 DIAGNOSIS — E11.65 TYPE 2 DIABETES MELLITUS WITH HYPERGLYCEMIA, WITHOUT LONG-TERM CURRENT USE OF INSULIN (HCC): Primary | ICD-10-CM

## 2021-05-28 DIAGNOSIS — Z23 ENCOUNTER FOR IMMUNIZATION: ICD-10-CM

## 2021-05-28 DIAGNOSIS — M10.00 IDIOPATHIC GOUT, UNSPECIFIED CHRONICITY, UNSPECIFIED SITE: ICD-10-CM

## 2021-05-28 DIAGNOSIS — Z11.59 ENCOUNTER FOR HEPATITIS C SCREENING TEST FOR LOW RISK PATIENT: ICD-10-CM

## 2021-05-28 PROBLEM — M10.9 GOUT: Status: ACTIVE | Noted: 2021-05-28

## 2021-05-28 PROCEDURE — 99214 OFFICE O/P EST MOD 30 MIN: CPT | Performed by: FAMILY MEDICINE

## 2021-05-28 PROCEDURE — 83036 HEMOGLOBIN GLYCOSYLATED A1C: CPT | Performed by: FAMILY MEDICINE

## 2021-05-28 RX ORDER — EMPAGLIFLOZIN AND METFORMIN HYDROCHLORIDE 5; 500 MG/1; MG/1
1 TABLET ORAL 2 TIMES DAILY WITH MEALS
Qty: 180 TABLET | Refills: 3 | Status: SHIPPED | OUTPATIENT
Start: 2021-05-28 | End: 2021-06-01

## 2021-05-28 RX ORDER — ALLOPURINOL 100 MG/1
TABLET ORAL
Qty: 90 TABLET | Refills: 3 | Status: SHIPPED | OUTPATIENT
Start: 2021-05-28 | End: 2021-06-01

## 2021-05-28 NOTE — PROGRESS NOTES
Chief Complaint   Patient presents with   [facility]     Patient has not been out of the country in (14 months), NO diarrhea, NO cough, NO chest conjestion, NO temp. Pt has not been around anyone with these symptoms. Health Maintenance reviewed. I have reviewed the patient's medical history in detail and updated the computerized patient record. 1. Have you been to the ER, urgent care clinic since your last visit? No  Hospitalized since your last visit?  no    2. Have you seen or consulted any other health care providers outside of the [address] [place] [address] [place] since your last visit? no Include any pap smears or colon screening. Encouraged pt to discuss pt's wishes with spouse/partner/family and bring them in the next appt to follow thru with the Advanced Directive    @  [address], last 12 mths 11/10/2020   Able to walk? Yes   Fall in past 12 months? No       3 most recent PHQ Screens 5/28/2021   Little interest or pleasure in doing things Several days   Feeling down, depressed, irritable, or hopeless Several days   Total Score PHQ 2 2       Abuse Screening Questionnaire 5/28/2021   Do you ever feel afraid of your partner? N   Are you in a relationship with someone who physically or mentally threatens you? N   Is it safe for you to go home?  Y       ADL Assessment 5/28/2021   Feeding yourself No Help Needed   Getting from bed to chair No Help Needed   Getting dressed No Help Needed   Bathing or showering No Help Needed   Walk across the room (includes cane/walker) No Help Needed   Using the telphone No Help Needed   Taking your medications No Help Needed   Preparing meals No Help Needed   Managing money (expenses/bills) No Help Needed   Moderately strenuous housework (laundry) No Help Needed   Shopping for personal items (toiletries/medicines) No Help Needed   Shopping for groceries No Help Needed   Driving No Help Needed   Climbing a flight of stairs No Help Needed   Getting to places beyond walking distances No Help Needed

## 2021-05-28 NOTE — PROGRESS NOTES
Subjective:     Angeles Escobar is a 50 y.o. male seen for follow-up of diabetes. He has had hypoglycemic attacks. .no  Blood sugar control has been good 193 non fasting usu low 100's    Lab Results   Component Value Date/Time    Hemoglobin A1c 8.0 (H) 11/04/2020 10:08 AM    Hemoglobin A1c 8.4 (H) 06/24/2020 01:06 PM    Glucose 145 (H) 05/12/2021 12:22 PM    Microalb/Creat ratio (ug/mg creat.) 11 07/14/2020 01:20 PM    LDL, calculated 66 06/24/2020 01:06 PM    Creatinine 0.75 (L) 05/12/2021 12:22 PM       He has diabetes and hypertension. Angeles Escobar has the additional concern of feels good    Reports taking blood pressure medications without side affects. No complaints of exertional chest pain, excessive shortness of breath or focal weakness. Minimal swelling in lower legs or dizziness with standing. Diet and Lifestyle: smoker occa cigar 2 x per week. Patient Active Problem List    Diagnosis Date Noted    Gout 05/28/2021    Ob[facility] morbid ([facility] 02/18/2020    Hyperglycemia due to type 2 diabetes mellitus ([facility] 02/18/2020    Essential hypertension 02/18/2020     Current Outpatient Medications   Medication Sig Dispense Refill    empagliflozin-metFORMIN (Synjardy) 5-500 mg per tablet Take 1 Tablet by mouth two (2) times daily (with meals). 180 Tablet 3    allopurinoL (ZYLOPRIM) 100 mg tablet TAKE 1 TABLET BY MOUTH EVERY DAY 90 Tablet 3    lisinopriL (PRINIVIL, ZESTRIL) 20 mg tablet TAKE 1 TABLET BY MOUTH DAILY 90 Tab 3    indomethacin (INDOCIN) 25 mg capsule Take 1 Cap by mouth three (3) times daily. As needed 60 Cap 1    multivitamin, tx-iron-ca-min (THERA-M w/ IRON) 9 mg iron-400 mcg tab tablet Take 1 Tab by mouth daily.  acetaminophen (TylenoL) 325 mg tablet Take 650 mg by mouth every four (4) hours as needed for Pain.  ascorbic acid, vitamin C, (VITAMIN C) 250 mg tablet Take  by mouth.        No Known Allergies  Past Medical History:   Diagnosis Date    Diabetes ([facility]     Headache     Hypertension     Thyroid disease      Social History     Tobacco Use    Smoking status: Current Some Day Smoker     Types: Cigars    Smokeless tobacco: Never Used    Tobacco comment: 2 per mo cigar   Substance Use Topics    Alcohol use: Yes     Alcohol/week: 1.0 standard drinks     Types: 1 Shots of liquor per week     Comment: occ             Review of Systems  Pertinent items are noted in HPI. Objective:     Significant for the following: WNL  Visit Vitals  /88 (BP 1 Location: Right arm, BP Patient Position: Sitting, BP Cuff Size: Adult)   Pulse 86   Temp 98.2 °F (36.8 °C) (Oral)   Resp 20   Ht 6' (1.829 m)   Wt (!) 374 lb 9.6 oz (169.9 kg)   SpO2 99%   BMI 50.80 kg/m²     WD WN male NAD  Heart RRR without murmers clicks or rubs  Lungs CTA  Abdo soft nontender  Ext no edema      Lab review: labs reviewed, I note that glycosylated hemoglobin mildly abnormal but acceptable. Assessment/Plan:     Follow-up diabetes stable, borderline controlled, needs to quit smoking. Diabetic issues reviewed with him: all medications, side effects and compliance discussed carefully, glycohemoglobin and other lab monitoring discussed and patient urged in the strongest terms to quit smoking. ICD-10-CM ICD-9-CM    1. Type 2 diabetes mellitus with hyperglycemia, without long-term current use of insulin (HCC)  E11.65 250.00 REFERRAL TO OPTOMETRY     790.29 AMB POC HEMOGLOBIN A1C      empagliflozin-metFORMIN (Synjardy) 5-500 mg per tablet      HEMOGLOBIN A1C WITH EAG      CANCELED: HEMOGLOBIN A1C WITH EAG   2. Essential hypertension  Q47 466.2 METABOLIC PANEL, BASIC   3. Idiopathic gout, unspecified chronicity, unspecified site  M10.00 274.9 allopurinoL (ZYLOPRIM) 100 mg tablet   4. Obesity, morbid ([address]  E66.01 278.01    5. Screening PSA (prostate specific antigen)  Z12.5 V76.44 PSA, DIAGNOSTIC (PROSTATE SPECIFIC AG)      PSA, DIAGNOSTIC (PROSTATE SPECIFIC AG)   6.  Encounter for hepatitis C screening test for low risk patient  Z11.59 V73.89 HEP B SURFACE AG      RPR      HEPATITIS C AB, RFLX TO QT BY PCR      HIV 1/2 AG/AB, 4TH GENERATION,W RFLX CONFIRM   7. Encounter for immunization  Z23 V03.89 diph,Pertuss,Acell,,Tet Vac-PF (ADACEL) 2 Lf-(2.5-5-3-5 mcg)-5Lf/0.5 mL susp           Chronic Conditions Addressed Today     1. Obesity, morbid ([address]    2. Hyperglycemia due to type 2 diabetes mellitus ([facility] - Primary     Relevant Medications     empagliflozin-metFORMIN (Synjardy) 5-500 mg per tablet     Other Relevant Orders     REFERRAL TO OPTOMETRY     AMB POC HEMOGLOBIN A1C     HEMOGLOBIN A1C WITH EAG    3. Gout     Relevant Medications     allopurinoL (ZYLOPRIM) 100 mg tablet    4.  Essential hypertension     Relevant Orders     METABOLIC PANEL, BASIC      Acute Diagnoses Addressed Today     Screening PSA (prostate specific antigen)            Relevant Orders        PSA, DIAGNOSTIC (PROSTATE SPECIFIC AG)        PSA, DIAGNOSTIC (PROSTATE SPECIFIC AG)    Encounter for hepatitis C screening test for low risk patient            Relevant Orders        HEP B SURFACE AG        RPR        HEPATITIS C AB, RFLX TO QT BY PCR        HIV 1/2 AG/AB, 4TH GENERATION,W RFLX CONFIRM    Encounter for immunization                Labs to evaluate further about this when he comes back      3 months

## 2021-06-01 LAB — HBA1C MFR BLD HPLC: 7.5 %

## 2021-11-25 LAB
BUN SERPL-MCNC: 14 MG/DL (ref 6–24)
BUN/CREAT SERPL: 19 (ref 9–20)
CALCIUM SERPL-MCNC: 9.2 MG/DL (ref 8.7–10.2)
CHLORIDE SERPL-SCNC: 102 MMOL/L (ref 96–106)
CO2 SERPL-SCNC: 26 MMOL/L (ref 20–29)
CREAT SERPL-MCNC: 0.72 MG/DL (ref 0.76–1.27)
EST. AVERAGE GLUCOSE BLD GHB EST-MCNC: 169 MG/DL
GLUCOSE SERPL-MCNC: 121 MG/DL (ref 65–99)
HBA1C MFR BLD: 7.5 % (ref 4.8–5.6)
HBV SURFACE AG SERPL QL IA: NEGATIVE
HCV AB S/CO SERPL IA: <0.1 S/CO RATIO (ref 0–0.9)
HCV AB SERPL QL IA: NORMAL
HIV 1+2 AB+HIV1 P24 AG SERPL QL IA: NON REACTIVE
POTASSIUM SERPL-SCNC: 4.7 MMOL/L (ref 3.5–5.2)
PSA SERPL-MCNC: 1.1 NG/ML (ref 0–4)
RPR SER QL: NON REACTIVE
SODIUM SERPL-SCNC: 142 MMOL/L (ref 134–144)

## 2021-12-08 ENCOUNTER — OFFICE VISIT (OUTPATIENT)
Dept: INTERNAL MEDICINE CLINIC | Age: 56
End: 2021-12-08
Payer: COMMERCIAL

## 2021-12-08 VITALS
OXYGEN SATURATION: 97 % | RESPIRATION RATE: 16 BRPM | SYSTOLIC BLOOD PRESSURE: 133 MMHG | HEIGHT: 72 IN | HEART RATE: 91 BPM | DIASTOLIC BLOOD PRESSURE: 88 MMHG | BODY MASS INDEX: 42.66 KG/M2 | WEIGHT: 315 LBS | TEMPERATURE: 98.5 F

## 2021-12-08 DIAGNOSIS — M10.00 IDIOPATHIC GOUT, UNSPECIFIED CHRONICITY, UNSPECIFIED SITE: ICD-10-CM

## 2021-12-08 DIAGNOSIS — N52.9 ERECTILE DYSFUNCTION, UNSPECIFIED ERECTILE DYSFUNCTION TYPE: ICD-10-CM

## 2021-12-08 DIAGNOSIS — E11.65 TYPE 2 DIABETES MELLITUS WITH HYPERGLYCEMIA, WITHOUT LONG-TERM CURRENT USE OF INSULIN (HCC): Primary | ICD-10-CM

## 2021-12-08 DIAGNOSIS — I10 ESSENTIAL HYPERTENSION: ICD-10-CM

## 2021-12-08 DIAGNOSIS — E66.01 OBESITY, MORBID (HCC): ICD-10-CM

## 2021-12-08 PROCEDURE — 99214 OFFICE O/P EST MOD 30 MIN: CPT | Performed by: FAMILY MEDICINE

## 2021-12-08 RX ORDER — VARDENAFIL HYDROCHLORIDE 20 MG/1
20 TABLET ORAL
Qty: 5 TABLET | Refills: 2 | Status: SHIPPED | OUTPATIENT
Start: 2021-12-08 | End: 2022-05-05

## 2021-12-08 NOTE — PROGRESS NOTES
Chief Complaint   Patient presents with    Diabetes     Patient has not been out of the country in (14 months), NO diarrhea, NO cough, NO chest conjestion, NO temp. Pt has not been around anyone with these symptoms. Health Maintenance reviewed. I have reviewed the patient's medical history in detail and updated the computerized patient record. 1. Have you been to the ER, urgent care clinic since your last visit? No   Hospitalized since your last visit? no     2. Have you seen or consulted any other health care providers outside of the [address] [place] [address] since your last visit? No  Include any pap smears or colon screening. Encouraged pt to discuss pt's wishes with spouse/partner/family and bring them in the next appt to follow thru with the Advanced Directive    @  [address], last 12 mths 11/10/2020   Able to walk? Yes   Fall in past 12 months? No       3 most recent PHQ Screens 12/8/2021   Little interest or pleasure in doing things Several days   Feeling down, depressed, irritable, or hopeless Several days   Total Score PHQ 2 2       Abuse Screening Questionnaire 5/28/2021   Do you ever feel afraid of your partner? N   Are you in a relationship with someone who physically or mentally threatens you? N   Is it safe for you to go home?  Y       ADL Assessment 5/28/2021   Feeding yourself No Help Needed   Getting from bed to chair No Help Needed   Getting dressed No Help Needed   Bathing or showering No Help Needed   Walk across the room (includes cane/walker) No Help Needed   Using the telphone No Help Needed   Taking your medications No Help Needed   Preparing meals No Help Needed   Managing money (expenses/bills) No Help Needed   Moderately strenuous housework (laundry) No Help Needed   Shopping for personal items (toiletries/medicines) No Help Needed   Shopping for groceries No Help Needed   Driving No Help Needed   Climbing a flight of stairs No Help Needed   Getting to places beyond walking distances No Help Needed

## 2021-12-08 NOTE — PROGRESS NOTES
Subjective:     Nicol Maguire is a 49 y.o. male seen for follow-up of diabetes. He has had hypoglycemic attacks. .no  Blood sugar control has been ok    Lab Results   Component Value Date/Time    Hemoglobin A1c 7.5 (H) 11/24/2021 12:42 PM    Hemoglobin A1c 8.0 (H) 11/04/2020 10:08 AM    Hemoglobin A1c 8.4 (H) 06/24/2020 01:06 PM    Glucose 121 (H) 11/24/2021 12:42 PM    Microalb/Creat ratio (ug/mg creat.) 11 07/14/2020 01:20 PM    LDL, calculated 66 06/24/2020 01:06 PM    Creatinine 0.72 (L) 11/24/2021 12:42 PM       He has diabetes, hypertension and hyperlipidemia. Nicol Maguire has the additional concern of erection issues x 1 yr or more, wants to have sex. viagra not helped. Wants to have. Reports taking blood pressure medications without side affects. No complaints of exertional chest pain, excessive shortness of breath or focal weakness. Minimal swelling in lower legs or dizziness with standing. Diet and Lifestyle: does not rigorously follow a diabetic diet, smoker occa cigar. Patient Active Problem List    Diagnosis Date Noted    Gout 05/28/2021    Obesity, morbid ([address] 02/18/2020    Hyperglycemia due to type 2 diabetes mellitus ([address] 02/18/2020    Essential hypertension 02/18/2020     No Known Allergies  Past Medical History:   Diagnosis Date    Diabetes ([address]     Headache     Hypertension     Thyroid disease      Social History     Tobacco Use    Smoking status: Current Some Day Smoker     Types: Cigars    Smokeless tobacco: Never Used    Tobacco comment: 2 per mo cigar   Substance Use Topics    Alcohol use: Yes     Alcohol/week: 1.0 standard drink     Types: 1 Shots of liquor per week     Comment: occ             Review of Systems  Pertinent items are noted in HPI.     Objective:     Significant for the following: OW  Visit Vitals  /88 (BP 1 Location: Left arm, BP Patient Position: Sitting, BP Cuff Size: Adult)   Pulse 91   Temp 98.5 °F (36.9 °C) (Temporal)   Resp 16 Ht 6' (1.829 m)   Wt (!) 370 lb (167.8 kg)   SpO2 97%   BMI 50.18 kg/m²     Wt Readings from Last 3 Encounters:   12/08/21 (!) 370 lb (167.8 kg)   05/28/21 (!) 374 lb 9.6 oz (169.9 kg)   11/10/20 (!) 370 lb (167.8 kg)       WD WN male NAD  Heart RRR without murmers clicks or rubs  Lungs CTA  Abdo soft nontender  Ext no edema      Lab review: labs reviewed, I note that glycosylated hemoglobin mildly abnormal but acceptable. Assessment/Plan:     Follow-up diabetes well controlled, stable. Diabetic issues reviewed with him: diabetic diet discussed in detail, written exchange diet given. If ED no better can send to urolgy        Chronic Conditions Addressed Today     1. Obesity, morbid ([facility]    2. Hyperglycemia due to type 2 diabetes mellitus ([facility] - Primary    3. Gout    4. Essential hypertension      Acute Diagnoses Addressed Today     Erectile dysfunction, unspecified erectile dysfunction type            Orders Placed This Encounter    vardenafiL (LEVITRA) 20 mg tablet     Sig: Take 20 mg by mouth daily as needed for Erectile Dysfunction. Dispense:  5 Tablet     Refill:  2     Current Outpatient Medications   Medication Sig Dispense Refill    vardenafiL (LEVITRA) 20 mg tablet Take 20 mg by mouth daily as needed for Erectile Dysfunction. 5 Tablet 2    allopurinoL (ZYLOPRIM) 100 mg tablet TAKE 1 TABLET BY MOUTH EVERY DAY 30 Tablet 5    empagliflozin-metFORMIN (Synjardy) 5-500 mg per tablet Take 1 Tablet by mouth two (2) times daily (with meals). 60 Tablet 5    lisinopriL (PRINIVIL, ZESTRIL) 20 mg tablet TAKE 1 TABLET BY MOUTH DAILY 90 Tab 3    indomethacin (INDOCIN) 25 mg capsule Take 1 Cap by mouth three (3) times daily. As needed 60 Cap 1    multivitamin, tx-iron-ca-min (THERA-M w/ IRON) 9 mg iron-400 mcg tab tablet Take 1 Tab by mouth daily.  acetaminophen (TylenoL) 325 mg tablet Take 650 mg by mouth every four (4) hours as needed for Pain.       ascorbic acid, vitamin C, (VITAMIN C) 250 mg tablet Take  by mouth. Follow-up and Dispositions    · Return in about 6 months (around 6/8/2022) for routine follow up.

## 2022-03-10 ENCOUNTER — VIRTUAL VISIT (OUTPATIENT)
Dept: INTERNAL MEDICINE CLINIC | Age: 57
End: 2022-03-10
Payer: COMMERCIAL

## 2022-03-10 DIAGNOSIS — R05.9 COUGH: Primary | ICD-10-CM

## 2022-03-10 DIAGNOSIS — E11.65 TYPE 2 DIABETES MELLITUS WITH HYPERGLYCEMIA, WITHOUT LONG-TERM CURRENT USE OF INSULIN (HCC): ICD-10-CM

## 2022-03-10 PROCEDURE — 99213 OFFICE O/P EST LOW 20 MIN: CPT | Performed by: FAMILY MEDICINE

## 2022-03-10 RX ORDER — AZITHROMYCIN 250 MG/1
250 TABLET, FILM COATED ORAL SEE ADMIN INSTRUCTIONS
Qty: 6 TABLET | Refills: 0 | Status: SHIPPED | OUTPATIENT
Start: 2022-03-10 | End: 2022-05-05 | Stop reason: ALTCHOICE

## 2022-03-10 NOTE — PROGRESS NOTES
Subjective:   Suly Christie is a 49 y.o. male who complains of sore throat, cough described as productive of creamy sputum, clear nasal discharge and hoarseness for 2 days, gradually worsening since that time. He denies a history of fevers, rash on body, shortness of breath and wheezing. 3/3 covid vaccines  Evaluation to date: none. Treatment to date: OTC products. Patient does not smoke cigarettes. Smokes cigar  Relevant PMH: DM. No Known Allergies      Patient Active Problem List    Diagnosis Date Noted    Gout 05/28/2021    Obesity, morbid ([facility] 02/18/2020    Hyperglycemia due to type 2 diabetes mellitus ([facility] 02/18/2020    Essential hypertension 02/18/2020     Current Outpatient Medications   Medication Sig Dispense Refill    vardenafiL (LEVITRA) 20 mg tablet Take 20 mg by mouth daily as needed for Erectile Dysfunction. 5 Tablet 2    allopurinoL (ZYLOPRIM) 100 mg tablet TAKE 1 TABLET BY MOUTH EVERY DAY 30 Tablet 5    empagliflozin-metFORMIN (Synjardy) 5-500 mg per tablet Take 1 Tablet by mouth two (2) times daily (with meals). 60 Tablet 5    lisinopriL (PRINIVIL, ZESTRIL) 20 mg tablet TAKE 1 TABLET BY MOUTH DAILY 90 Tab 3    indomethacin (INDOCIN) 25 mg capsule Take 1 Cap by mouth three (3) times daily. As needed 60 Cap 1    multivitamin, tx-iron-ca-min (THERA-M w/ IRON) 9 mg iron-400 mcg tab tablet Take 1 Tab by mouth daily.  acetaminophen (TylenoL) 325 mg tablet Take 650 mg by mouth every four (4) hours as needed for Pain.  ascorbic acid, vitamin C, (VITAMIN C) 250 mg tablet Take  by mouth. No Known Allergies  Social History     Tobacco Use    Smoking status: Current Some Day Smoker     Types: Cigars    Smokeless tobacco: Never Used    Tobacco comment: 2 per mo cigar   Substance Use Topics    Alcohol use:  Yes     Alcohol/week: 1.0 standard drink     Types: 1 Shots of liquor per week     Comment: occ        Review of Systems  Pertinent items are noted in HPI.    Objective: There were no vitals taken for this visit. General:     Eyes:    Ears:    Sinuses:    Mouth:     Neck:    Heart:    Lungs:    Abdomen:       Assessment/Plan:   pneumonia versus bronchitis  If no better can get  Antibiotics per orders. RTC prn. Encounter Diagnoses   Name Primary?  Cough Yes    Type 2 diabetes mellitus with hyperglycemia, without long-term current use of insulin (HCC)      Orders Placed This Encounter    azithromycin (ZITHROMAX) 250 mg tablet   . Marvin Herbert is a 49 y.o. male who was phone evaluated on 3/10/2022. Consent:  He and/or his healthcare decision maker is aware that this patient-initiated Telehealth encounter is a billable service, with coverage as determined by his insurance carrier. He is aware that he may receive a bill and has provided verbal consent to proceed: Yes    I was in the office while conducting this encounter. Assessment & Plan:   Diagnoses and all orders for this visit:    1. Cough  -     azithromycin (ZITHROMAX) 250 mg tablet; Take 1 Tablet by mouth See Admin Instructions. Take two tablets today then one tablet daily for next 4 days    2. Type 2 diabetes mellitus with hyperglycemia, without long-term current use of insulin ([facility])        Orders Placed This Encounter    azithromycin (ZITHROMAX) 250 mg tablet     Sig: Take 1 Tablet by mouth See Admin Instructions. Take two tablets today then one tablet daily for next 4 days     Dispense:  6 Tablet     Refill:  0         712  Subjective:      As above      We discussed the expected course, resolution and complications of the diagnosis(es) in detail. Medication risks, benefits, costs, interactions, and alternatives were discussed as indicated. I advised him to contact the office if his condition worsens, changes or fails to improve as anticipated. He expressed understanding with the diagnosis(es) and plan.      Pursuant to the emergency declaration under the [employer] and the [employer] Act, [address] [facility] waiver authority and the [employer] Act, this Virtual  Visit was conducted, with patient's consent, to reduce the patient's risk of exposure to COVID-19 and provide continuity of care for an established patient. Services were provided through a video synchronous discussion virtually to substitute for in-person clinic visit.     Earnest Das MD    12 min

## 2022-03-10 NOTE — PROGRESS NOTES
Chief Complaint   Patient presents with    Cough     cough, chest conjestion     Patient is aware that this is a Virtual Visit or Phone Call Only doctor's visit. Patient has not been out of the country in (14 months), NO diarrhea, NO cough, NO chest conjestion, NO temp. Pt has not been around anyone with these symptoms. Health Maintenance reviewed. I have reviewed the patient's medical history in detail and updated the computerized patient record. 1. Have you been to the ER, urgent care clinic since your last visit? no Hospitalized since your last visit?  no    2. Have you seen or consulted any other health care providers outside of the [address] [place] [address] since your last visit? No  Include any pap smears or colon screening. Encouraged pt to discuss pt's wishes with spouse/partner/family and bring them in the next appt to follow thru with the Advanced Directive      Fall Risk Assessment, last 12 mths 11/10/2020   Able to walk? Yes   Fall in past 12 months? No       3 most recent PHQ Screens 3/10/2022   Little interest or pleasure in doing things Several days   Feeling down, depressed, irritable, or hopeless Several days   Total Score PHQ 2 2       Abuse Screening Questionnaire 5/28/2021   Do you ever feel afraid of your partner? N   Are you in a relationship with someone who physically or mentally threatens you? N   Is it safe for you to go home?  Y       ADL Assessment 5/28/2021   Feeding yourself No Help Needed   Getting from bed to chair No Help Needed   Getting dressed No Help Needed   Bathing or showering No Help Needed   Walk across the room (includes cane/walker) No Help Needed   Using the telphone No Help Needed   Taking your medications No Help Needed   Preparing meals No Help Needed   Managing money (expenses/bills) No Help Needed   Moderately strenuous housework (laundry) No Help Needed   Shopping for personal items (toiletries/medicines) No Help Needed   Shopping for groceries No Help Needed   Driving No Help Needed   Climbing a flight of stairs No Help Needed   Getting to places beyond walking distances No Help Needed

## 2022-03-18 PROBLEM — I10 ESSENTIAL HYPERTENSION: Status: ACTIVE | Noted: 2020-02-18

## 2022-03-18 PROBLEM — M10.9 GOUT: Status: ACTIVE | Noted: 2021-05-28

## 2022-03-19 PROBLEM — E11.65 HYPERGLYCEMIA DUE TO TYPE 2 DIABETES MELLITUS (HCC): Status: ACTIVE | Noted: 2020-02-18

## 2022-03-19 PROBLEM — E66.01 OBESITY, MORBID (HCC): Status: ACTIVE | Noted: 2020-02-18

## 2022-03-21 DIAGNOSIS — E11.65 TYPE 2 DIABETES MELLITUS WITH HYPERGLYCEMIA, WITHOUT LONG-TERM CURRENT USE OF INSULIN (HCC): ICD-10-CM

## 2022-03-21 NOTE — TELEPHONE ENCOUNTER
Patient refill request/ 90 day supply      Requested Prescriptions     Pending Prescriptions Disp Refills    empagliflozin-metFORMIN (Synjardy) 5-500 mg per tablet 60 Tablet 5     Sig: Take 1 Tablet by mouth two (2) times daily (with meals).

## 2022-03-22 RX ORDER — EMPAGLIFLOZIN AND METFORMIN HYDROCHLORIDE 5; 500 MG/1; MG/1
1 TABLET ORAL 2 TIMES DAILY WITH MEALS
Qty: 60 TABLET | Refills: 5 | Status: SHIPPED | OUTPATIENT
Start: 2022-03-22 | End: 2022-05-05 | Stop reason: SDUPTHER

## 2022-05-01 DIAGNOSIS — I10 ESSENTIAL HYPERTENSION: ICD-10-CM

## 2022-05-02 RX ORDER — LISINOPRIL 20 MG/1
TABLET ORAL
Qty: 90 TABLET | Refills: 3 | Status: SHIPPED | OUTPATIENT
Start: 2022-05-02 | End: 2022-05-05 | Stop reason: SDUPTHER

## 2022-05-05 ENCOUNTER — VIRTUAL VISIT (OUTPATIENT)
Dept: INTERNAL MEDICINE CLINIC | Age: 57
End: 2022-05-05
Payer: COMMERCIAL

## 2022-05-05 DIAGNOSIS — E11.65 TYPE 2 DIABETES MELLITUS WITH HYPERGLYCEMIA, WITHOUT LONG-TERM CURRENT USE OF INSULIN (HCC): Primary | ICD-10-CM

## 2022-05-05 DIAGNOSIS — N40.1 BPH WITH OBSTRUCTION/LOWER URINARY TRACT SYMPTOMS: ICD-10-CM

## 2022-05-05 DIAGNOSIS — M10.00 IDIOPATHIC GOUT, UNSPECIFIED CHRONICITY, UNSPECIFIED SITE: ICD-10-CM

## 2022-05-05 DIAGNOSIS — N13.8 BPH WITH OBSTRUCTION/LOWER URINARY TRACT SYMPTOMS: ICD-10-CM

## 2022-05-05 DIAGNOSIS — I10 ESSENTIAL HYPERTENSION: ICD-10-CM

## 2022-05-05 DIAGNOSIS — E78.2 MIXED HYPERLIPIDEMIA: ICD-10-CM

## 2022-05-05 PROCEDURE — 99213 OFFICE O/P EST LOW 20 MIN: CPT | Performed by: FAMILY MEDICINE

## 2022-05-05 RX ORDER — EMPAGLIFLOZIN AND METFORMIN HYDROCHLORIDE 5; 500 MG/1; MG/1
1 TABLET ORAL 2 TIMES DAILY WITH MEALS
Qty: 180 TABLET | Refills: 1 | Status: SHIPPED | OUTPATIENT
Start: 2022-05-05 | End: 2022-06-15 | Stop reason: SDUPTHER

## 2022-05-05 RX ORDER — LISINOPRIL 20 MG/1
20 TABLET ORAL DAILY
Qty: 90 TABLET | Refills: 3 | Status: SHIPPED | OUTPATIENT
Start: 2022-05-05 | End: 2022-08-01 | Stop reason: SDUPTHER

## 2022-05-05 RX ORDER — ALLOPURINOL 100 MG/1
100 TABLET ORAL DAILY
Qty: 90 TABLET | Refills: 3 | Status: SHIPPED | OUTPATIENT
Start: 2022-05-05

## 2022-05-05 NOTE — PROGRESS NOTES
Chief Complaint   Patient presents with    Personal Problem     Pt wants a urology referral     Patient is aware that this is a Virtual Visit or Phone Call Only doctor's visit. Patient has not been out of the country in (14 months), NO diarrhea, NO cough, NO chest conjestion, NO temp. Pt has not been around anyone with these symptoms. Health Maintenance reviewed. I have reviewed the patient's medical history in detail and updated the computerized patient record. 1. Have you been to the ER, urgent care clinic since your last visit? no Hospitalized since your last visit?  no    2. Have you seen or consulted any other health care providers outside of the [address] [place] [address] since your last visit? No  Include any pap smears or colon screening. Encouraged pt to discuss pt's wishes with spouse/partner/family and bring them in the next appt to follow thru with the Advanced Directive      Fall Risk Assessment, last 12 mths 11/10/2020   Able to walk? Yes   Fall in past 12 months? No       3 most recent PHQ Screens 3/10/2022   Little interest or pleasure in doing things Several days   Feeling down, depressed, irritable, or hopeless Several days   Total Score PHQ 2 2       Abuse Screening Questionnaire 5/28/2021   Do you ever feel afraid of your partner? N   Are you in a relationship with someone who physically or mentally threatens you? N   Is it safe for you to go home?  Y       ADL Assessment 5/28/2021   Feeding yourself No Help Needed   Getting from bed to chair No Help Needed   Getting dressed No Help Needed   Bathing or showering No Help Needed   Walk across the room (includes cane/walker) No Help Needed   Using the telphone No Help Needed   Taking your medications No Help Needed   Preparing meals No Help Needed   Managing money (expenses/bills) No Help Needed   Moderately strenuous housework (laundry) No Help Needed   Shopping for personal items (toiletries/medicines) No Help Needed   Shopping for groceries No Help Needed   Driving No Help Needed   Climbing a flight of stairs No Help Needed   Getting to places beyond walking distances No Help Needed

## 2022-05-05 NOTE — PROGRESS NOTES
Ryan Srinivasan is a 49 y.o. male who was phone evaluated on 5/5/2022. Consent:  He and/or his healthcare decision maker is aware that this patient-initiated Telehealth encounter is a billable service, with coverage as determined by his insurance carrier. He is aware that he may receive a bill and has provided verbal consent to proceed: Yes    I was at home while conducting this encounter. Assessment & Plan:   Diagnoses and all orders for this visit:    1. Type 2 diabetes mellitus with hyperglycemia, without long-term current use of insulin ([facility]    2. Essential hypertension    3. BPH with obstruction/lower urinary tract symptoms    4. BMI 50.0-59.9, adult ([facility])      25 minutes       Follow-up and Dispositions    · Return in about 3 months (around 8/5/2022) for routine follow up. 712  Subjective:        See below    We discussed the expected course, resolution and complications of the diagnosis(es) in detail. Medication risks, benefits, costs, interactions, and alternatives were discussed as indicated. I advised him to contact the office if his condition worsens, changes or fails to improve as anticipated. He expressed understanding with the diagnosis(es) and plan. Pursuant to the emergency declaration under the [facility] waiver authority and the [employer] Act, this Virtual  Visit was conducted, with patient's consent, to reduce the patient's risk of exposure to COVID-19 and provide continuity of care for an established patient. Services were provided through a video synchronous discussion virtually to substitute for in-person clinic visit. Charmaine Beckford MD        Subjective:     Ryan Srinivasan is a 49 y.o. male seen for follow-up of diabetes. He has had hypoglycemic attacks.  .no  Blood sugar control has been good    Lab Results   Component Value Date/Time    Hemoglobin A1c 7.5 (H) 11/24/2021 12:42 PM    Hemoglobin A1c 8.0 (H) 11/04/2020 10:08 AM    Hemoglobin A1c 8.4 (H) 06/24/2020 01:06 PM    Glucose 121 (H) 11/24/2021 12:42 PM    Microalb/Creat ratio (ug/mg creat.) 11 07/14/2020 01:20 PM    LDL, calculated 66 06/24/2020 01:06 PM    Creatinine 0.72 (L) 11/24/2021 12:42 PM       He has diabetes, hypertension and hyperlipidemia. Ashleigh Hart has the additional concern of wants to see urology for difficulties with erection and concerns about prostate cancer which runs in his family. Serial PSAs have been within normal limits. Does relate some difficulties with urination    Reports taking blood pressure medications without side affects. No complaints of exertional chest pain, excessive shortness of breath or focal weakness. Minimal swelling in lower legs or dizziness with standing. Diet and Lifestyle: nonsmoker. Patient Active Problem List    Diagnosis Date Noted    Gout 05/28/2021    Obesity, morbid ([facility] 02/18/2020    Hyperglycemia due to type 2 diabetes mellitus ([facility] 02/18/2020    Essential hypertension 02/18/2020     Current Outpatient Medications   Medication Sig Dispense Refill    empagliflozin-metFORMIN (Synjardy) 5-500 mg per tablet Take 1 Tablet by mouth two (2) times daily (with meals). 180 Tablet 1    allopurinoL (ZYLOPRIM) 100 mg tablet Take 1 Tablet by mouth daily. 90 Tablet 3    lisinopriL (PRINIVIL, ZESTRIL) 20 mg tablet Take 1 Tablet by mouth daily. 90 Tablet 3    multivitamin, tx-iron-ca-min (THERA-M w/ IRON) 9 mg iron-400 mcg tab tablet Take 1 Tab by mouth daily.  acetaminophen (TylenoL) 325 mg tablet Take 650 mg by mouth every four (4) hours as needed for Pain.  ascorbic acid, vitamin C, (VITAMIN C) 250 mg tablet Take  by mouth.        No Known Allergies  Past Medical History:   Diagnosis Date    Diabetes ([facility]     Headache     Hypertension     Thyroid disease      Social History     Tobacco Use    Smoking status: Current Some Day Smoker Types: Cigars    Smokeless tobacco: Never Used    Tobacco comment: 2 per mo cigar   Substance Use Topics    Alcohol use: Yes     Alcohol/week: 1.0 standard drink     Types: 1 Shots of liquor per week     Comment: occ             Review of Systems  Pertinent items are noted in HPI. Objective:     Significant for the following: sounds NAD  There were no vitals taken for this visit. Lab review: labs reviewed, I note that glycosylated hemoglobin mildly abnormal but acceptable. Assessment/Plan:     Follow-up diabetes well controlled, stable. Diabetic issues reviewed with him: all medications, side effects and compliance discussed carefully and labs immediately prior to next visit. ICD-10-CM ICD-9-CM    1. Type 2 diabetes mellitus with hyperglycemia, without long-term current use of insulin (HCC)  E11.65 250.00      790.29    2. Essential hypertension  I10 401.9    3. BPH with obstruction/lower urinary tract symptoms  N40.1 600.01     N13.8 599.69    4.  BMI 50.0-59.9, adult [facility])  Z68.43 V85.43        Orders Placed This Encounter    METABOLIC PANEL, COMPREHENSIVE     Standing Status:   Future     Number of Occurrences:   1     Standing Expiration Date:   11/5/2022    HEMOGLOBIN A1C WITH EAG     Standing Status:   Future     Number of Occurrences:   1     Standing Expiration Date:   5/5/2023    PROSTATE SPECIFIC AG     Standing Status:   Future     Number of Occurrences:   1     Standing Expiration Date:   11/5/2022    MICROALBUMIN, UR, RAND W/ MICROALB/CREAT RATIO     Standing Status:   Future     Number of Occurrences:   1     Standing Expiration Date:   5/5/2023    LIPID PANEL     Standing Status:   Future     Number of Occurrences:   1     Standing Expiration Date:   11/5/2022    REFERRAL TO UROLOGY     Referral Priority:   Routine     Referral Type:   Consultation     Referral Reason:   Specialty Services Required     Referred to Provider:   MD Clive Cabral empagliflozin-metFORMIN (Synjardy) 5-500 mg per tablet     Sig: Take 1 Tablet by mouth two (2) times daily (with meals). Dispense:  180 Tablet     Refill:  1    allopurinoL (ZYLOPRIM) 100 mg tablet     Sig: Take 1 Tablet by mouth daily. Dispense:  90 Tablet     Refill:  3    lisinopriL (PRINIVIL, ZESTRIL) 20 mg tablet     Sig: Take 1 Tablet by mouth daily. Dispense:  90 Tablet     Refill:  3               Chronic Conditions Addressed Today     1. Hyperglycemia due to type 2 diabetes mellitus ([facility] - Primary     Relevant Medications     empagliflozin-metFORMIN (Synjardy) 5-500 mg per tablet     lisinopriL (PRINIVIL, ZESTRIL) 20 mg tablet     Other Relevant Orders     HEMOGLOBIN A1C WITH EAG     MICROALBUMIN, UR, RAND W/ MICROALB/CREAT RATIO    2. Gout     Relevant Medications     allopurinoL (ZYLOPRIM) 100 mg tablet    3.  Essential hypertension     Relevant Medications     lisinopriL (PRINIVIL, ZESTRIL) 20 mg tablet     Other Relevant Orders     METABOLIC PANEL, COMPREHENSIVE      Acute Diagnoses Addressed Today     BPH with obstruction/lower urinary tract symptoms            Relevant Medications        lisinopriL (PRINIVIL, ZESTRIL) 20 mg tablet        Other Relevant Orders        REFERRAL TO UROLOGY        PSA, DIAGNOSTIC (PROSTATE SPECIFIC AG)    BMI 50.0-59.9, adult (HCC)        Mixed hyperlipidemia            Relevant Orders        LIPID PANEL

## 2022-05-26 LAB
ALBUMIN SERPL-MCNC: 4.6 G/DL (ref 3.8–4.9)
ALBUMIN/CREAT UR: 13 MG/G CREAT (ref 0–29)
ALBUMIN/GLOB SERPL: 1.6 {RATIO} (ref 1.2–2.2)
ALP SERPL-CCNC: 77 IU/L (ref 44–121)
ALT SERPL-CCNC: 35 IU/L (ref 0–44)
AST SERPL-CCNC: 23 IU/L (ref 0–40)
BILIRUB SERPL-MCNC: <0.2 MG/DL (ref 0–1.2)
BUN SERPL-MCNC: 11 MG/DL (ref 6–24)
BUN/CREAT SERPL: 14 (ref 9–20)
CALCIUM SERPL-MCNC: 9.9 MG/DL (ref 8.7–10.2)
CHLORIDE SERPL-SCNC: 103 MMOL/L (ref 96–106)
CHOLEST SERPL-MCNC: 162 MG/DL (ref 100–199)
CO2 SERPL-SCNC: 22 MMOL/L (ref 20–29)
CREAT SERPL-MCNC: 0.76 MG/DL (ref 0.76–1.27)
CREAT UR-MCNC: 97.6 MG/DL
EGFR: 107 ML/MIN/1.73
EST. AVERAGE GLUCOSE BLD GHB EST-MCNC: 169 MG/DL
GLOBULIN SER CALC-MCNC: 2.9 G/DL (ref 1.5–4.5)
GLUCOSE SERPL-MCNC: 92 MG/DL (ref 65–99)
HBA1C MFR BLD: 7.5 % (ref 4.8–5.6)
HDLC SERPL-MCNC: 59 MG/DL
IMP & REVIEW OF LAB RESULTS: NORMAL
LDLC SERPL CALC-MCNC: 80 MG/DL (ref 0–99)
MICROALBUMIN UR-MCNC: 12.4 UG/ML
POTASSIUM SERPL-SCNC: 4.6 MMOL/L (ref 3.5–5.2)
PROT SERPL-MCNC: 7.5 G/DL (ref 6–8.5)
PSA SERPL-MCNC: 1.2 NG/ML (ref 0–4)
SODIUM SERPL-SCNC: 143 MMOL/L (ref 134–144)
TRIGL SERPL-MCNC: 129 MG/DL (ref 0–149)
VLDLC SERPL CALC-MCNC: 23 MG/DL (ref 5–40)

## 2022-06-15 ENCOUNTER — OFFICE VISIT (OUTPATIENT)
Dept: INTERNAL MEDICINE CLINIC | Age: 57
End: 2022-06-15
Payer: COMMERCIAL

## 2022-06-15 VITALS
WEIGHT: 315 LBS | HEIGHT: 72 IN | OXYGEN SATURATION: 97 % | RESPIRATION RATE: 16 BRPM | SYSTOLIC BLOOD PRESSURE: 110 MMHG | TEMPERATURE: 98.4 F | HEART RATE: 77 BPM | BODY MASS INDEX: 42.66 KG/M2 | DIASTOLIC BLOOD PRESSURE: 60 MMHG

## 2022-06-15 DIAGNOSIS — M1A.0790 IDIOPATHIC CHRONIC GOUT OF FOOT WITHOUT TOPHUS, UNSPECIFIED LATERALITY: ICD-10-CM

## 2022-06-15 DIAGNOSIS — L70.0 BLACKHEAD: ICD-10-CM

## 2022-06-15 DIAGNOSIS — I10 ESSENTIAL HYPERTENSION: ICD-10-CM

## 2022-06-15 DIAGNOSIS — E11.65 TYPE 2 DIABETES MELLITUS WITH HYPERGLYCEMIA, WITHOUT LONG-TERM CURRENT USE OF INSULIN (HCC): Primary | ICD-10-CM

## 2022-06-15 PROCEDURE — 99214 OFFICE O/P EST MOD 30 MIN: CPT | Performed by: FAMILY MEDICINE

## 2022-06-15 RX ORDER — SILDENAFIL 50 MG/1
100 TABLET, FILM COATED ORAL
COMMUNITY
Start: 2022-06-07

## 2022-06-15 RX ORDER — EMPAGLIFLOZIN AND METFORMIN HYDROCHLORIDE 5; 500 MG/1; MG/1
1 TABLET ORAL 2 TIMES DAILY WITH MEALS
Qty: 180 TABLET | Refills: 3 | Status: SHIPPED | OUTPATIENT
Start: 2022-06-15

## 2022-06-15 NOTE — PROGRESS NOTES
Chief Complaint   Patient presents with    Diabetes     FU    Hypertension     Patient has not been out of the country in (14 months), NO diarrhea, NO cough, NO chest conjestion, NO temp. Pt has not been around anyone with these symptoms. Health Maintenance reviewed. I have reviewed the patient's medical history in detail and updated the computerized patient record. 1. Have you been to the ER, urgent care clinic since your last visit? No   Hospitalized since your last visit?  no    2. Have you seen or consulted any other health care providers outside of the [address] [place] [address] since your last visit? No  Include any pap smears or colon screening. Encouraged pt to discuss pt's wishes with spouse/partner/family and bring them in the next appt to follow thru with the Advanced Directive    @  [address], last 12 mths 11/10/2020   Able to walk? Yes   Fall in past 12 months? No       3 most recent PHQ Screens 3/10/2022   Little interest or pleasure in doing things Several days   Feeling down, depressed, irritable, or hopeless Several days   Total Score PHQ 2 2       Abuse Screening Questionnaire 5/28/2021   Do you ever feel afraid of your partner? N   Are you in a relationship with someone who physically or mentally threatens you? N   Is it safe for you to go home?  Y       ADL Assessment 5/28/2021   Feeding yourself No Help Needed   Getting from bed to chair No Help Needed   Getting dressed No Help Needed   Bathing or showering No Help Needed   Walk across the room (includes cane/walker) No Help Needed   Using the telphone No Help Needed   Taking your medications No Help Needed   Preparing meals No Help Needed   Managing money (expenses/bills) No Help Needed   Moderately strenuous housework (laundry) No Help Needed   Shopping for personal items (toiletries/medicines) No Help Needed   Shopping for groceries No Help Needed   Driving No Help Needed   Climbing a flight of stairs No Help Needed   Getting to places beyond walking distances No Help Needed

## 2022-06-15 NOTE — PATIENT INSTRUCTIONS
Stopping Smoking: Care Instructions  Your Care Instructions     Cigarette smokers crave the nicotine in cigarettes. Giving it up is much harder than simply changing a habit. Your body has to stop craving the nicotine. It is hard to quit, but you can do it. There are many tools that people use to quit smoking. You may find that combining tools works best for you. There are several steps to quitting. First you get ready to quit. Then you get support to help you. After that, you learn new skills and behaviors to become a nonsmoker. For many people, a necessary step is getting and using medicine. Your doctor will help you set up the plan that best meets your needs. You may want to attend a smoking cessation program to help you quit smoking. When you choose a program, look for one that has proven success. Ask your doctor for ideas. You will greatly increase your chances of success if you take medicine as well as get counseling or join a cessation program.  Some of the changes you feel when you first quit tobacco are uncomfortable. Your body will miss the nicotine at first, and you may feel short-tempered and grumpy. You may have trouble sleeping or concentrating. Medicine can help you deal with these symptoms. You may struggle with changing your smoking habits and rituals. The last step is the tricky one: Be prepared for the smoking urge to continue for a time. This is a lot to deal with, but keep at it. You will feel better. Follow-up care is a key part of your treatment and safety. Be sure to make and go to all appointments, and call your doctor if you are having problems. It's also a good idea to know your test results and keep a list of the medicines you take. How can you care for yourself at home? · Ask your family, friends, and coworkers for support. You have a better chance of quitting if you have help and support.   · Join a support group, such as Nicotine Anonymous, for people who are trying to quit smoking. · Consider signing up for a smoking cessation program, such as the American Lung Association's Freedom from Smoking program.  · Get text messaging support. Go to the website at www.smokefree. gov to sign up for the [facility] program.  · Set a quit date. Pick your date carefully so that it is not right in the middle of a big deadline or stressful time. Once you quit, do not even take a puff. Get rid of all ashtrays and lighters after your last cigarette. Clean your house and your clothes so that they do not smell of smoke. · Learn how to be a nonsmoker. Think about ways you can avoid those things that make you reach for a cigarette. ? Avoid situations that put you at greatest risk for smoking. For some people, it is hard to have a drink with friends without smoking. For others, they might skip a coffee break with coworkers who smoke. ? Change your daily routine. Take a different route to work or eat a meal in a different place. · Cut down on stress. Calm yourself or release tension by doing an activity you enjoy, such as reading a book, taking a hot bath, or gardening. · Talk to your doctor or pharmacist about nicotine replacement therapy, which replaces the nicotine in your body. You still get nicotine but you do not use tobacco. Nicotine replacement products help you slowly reduce the amount of nicotine you need. These products come in several forms, many of them available over-the-counter:  ? Nicotine patches  ? Nicotine gum and lozenges  ? Nicotine inhaler  · Ask your doctor about bupropion (Wellbutrin) or varenicline (Chantix), which are prescription medicines. They do not contain nicotine. They help you by reducing withdrawal symptoms, such as stress and anxiety. · Some people find hypnosis, acupuncture, and massage helpful for ending the smoking habit. · Eat a healthy diet and get regular exercise. Having healthy habits will help your body move past its craving for nicotine.   · Be prepared to keep trying. Most people are not successful the first few times they try to quit. Do not get mad at yourself if you smoke again. Make a list of things you learned and think about when you want to try again, such as next week, next month, or next year. Where can you learn more? Go to http://www.gray.com/  Enter [id number] in the search box to learn more about \"Stopping Smoking: Care Instructions. \"  Current as of: October 28, 2021               Content Version: 13.2  © 9957-2171 [employer], [employer]. Care instructions adapted under license by [employer] (which disclaims liability or warranty for this information). If you have questions about a medical condition or this instruction, always ask your healthcare professional. [address] any warranty or liability for your use of this information.

## 2022-08-01 DIAGNOSIS — I10 ESSENTIAL HYPERTENSION: ICD-10-CM

## 2022-08-02 RX ORDER — LISINOPRIL 20 MG/1
20 TABLET ORAL DAILY
Qty: 90 TABLET | Refills: 3 | Status: SHIPPED | OUTPATIENT
Start: 2022-08-02

## 2022-08-11 ENCOUNTER — CLINICAL SUPPORT (OUTPATIENT)
Dept: INTERNAL MEDICINE CLINIC | Age: 57
End: 2022-08-11

## 2022-08-11 VITALS
SYSTOLIC BLOOD PRESSURE: 130 MMHG | RESPIRATION RATE: 16 BRPM | HEART RATE: 79 BPM | DIASTOLIC BLOOD PRESSURE: 81 MMHG | WEIGHT: 315 LBS | TEMPERATURE: 98.4 F | BODY MASS INDEX: 42.66 KG/M2 | HEIGHT: 72 IN

## 2022-08-11 DIAGNOSIS — Z01.30 BLOOD PRESSURE CHECK: Primary | ICD-10-CM

## 2022-08-11 NOTE — PROGRESS NOTES
Chief Complaint   Patient presents with    Blood Pressure Check     As per VORB from Otoniel Hopkins NP, BP was normal.

## 2022-10-03 ENCOUNTER — OFFICE VISIT (OUTPATIENT)
Dept: INTERNAL MEDICINE CLINIC | Age: 57
End: 2022-10-03
Payer: COMMERCIAL

## 2022-10-03 VITALS
BODY MASS INDEX: 42.66 KG/M2 | OXYGEN SATURATION: 95 % | WEIGHT: 315 LBS | RESPIRATION RATE: 16 BRPM | HEART RATE: 78 BPM | HEIGHT: 72 IN | TEMPERATURE: 98.4 F | DIASTOLIC BLOOD PRESSURE: 89 MMHG | SYSTOLIC BLOOD PRESSURE: 139 MMHG

## 2022-10-03 DIAGNOSIS — M77.8 LEFT SHOULDER TENDONITIS: ICD-10-CM

## 2022-10-03 DIAGNOSIS — Z23 ENCOUNTER FOR IMMUNIZATION: ICD-10-CM

## 2022-10-03 DIAGNOSIS — L02.219 CELLULITIS AND ABSCESS OF TRUNK: Primary | ICD-10-CM

## 2022-10-03 DIAGNOSIS — L03.319 CELLULITIS AND ABSCESS OF TRUNK: Primary | ICD-10-CM

## 2022-10-03 PROCEDURE — 90656 IIV3 VACC NO PRSV 0.5 ML IM: CPT | Performed by: FAMILY MEDICINE

## 2022-10-03 PROCEDURE — 99214 OFFICE O/P EST MOD 30 MIN: CPT | Performed by: FAMILY MEDICINE

## 2022-10-03 PROCEDURE — 10060 I&D ABSCESS SIMPLE/SINGLE: CPT | Performed by: FAMILY MEDICINE

## 2022-10-03 PROCEDURE — 96372 THER/PROPH/DIAG INJ SC/IM: CPT | Performed by: FAMILY MEDICINE

## 2022-10-03 PROCEDURE — 20610 DRAIN/INJ JOINT/BURSA W/O US: CPT | Performed by: FAMILY MEDICINE

## 2022-10-03 PROCEDURE — 90471 IMMUNIZATION ADMIN: CPT | Performed by: FAMILY MEDICINE

## 2022-10-03 RX ORDER — CEPHALEXIN 500 MG/1
500 CAPSULE ORAL 3 TIMES DAILY
Qty: 21 CAPSULE | Refills: 0 | Status: SHIPPED | OUTPATIENT
Start: 2022-10-03 | End: 2022-10-10

## 2022-10-03 RX ORDER — TRIAMCINOLONE ACETONIDE 40 MG/ML
80 INJECTION, SUSPENSION INTRA-ARTICULAR; INTRAMUSCULAR ONCE
Status: COMPLETED | OUTPATIENT
Start: 2022-10-03 | End: 2022-10-03

## 2022-10-03 RX ADMIN — TRIAMCINOLONE ACETONIDE 80 MG: 40 INJECTION, SUSPENSION INTRA-ARTICULAR; INTRAMUSCULAR at 16:02

## 2022-10-03 NOTE — PROGRESS NOTES
Chief Complaint   Patient presents with    Cyst     Bump on chest    Shoulder Pain     L/shoulder pain - cannot sleep at night     Patient has not been out of the country in (14 months), NO diarrhea, NO cough, NO chest conjestion, NO temp. Pt has not been around anyone with these symptoms. Health Maintenance reviewed. I have reviewed the patient's medical history in detail and updated the computerized patient record. 1. Have you been to the ER, urgent care clinic since your last visit? No  Hospitalized since your last visit? No     2. Have you seen or consulted any other health care providers outside of the [address] [place] [address] since your last visit? No  Include any pap smears or colon screening. Encouraged pt to discuss pt's wishes with spouse/partner/family and bring them in the next appt to follow thru with the Advanced Directive    @  [address], last 12 mths 11/10/2020   Able to walk? Yes   Fall in past 12 months? No       3 most recent PHQ Screens 3/10/2022   Little interest or pleasure in doing things Several days   Feeling down, depressed, irritable, or hopeless Several days   Total Score PHQ 2 2       Abuse Screening Questionnaire 5/28/2021   Do you ever feel afraid of your partner? N   Are you in a relationship with someone who physically or mentally threatens you? N   Is it safe for you to go home?  Y       ADL Assessment 5/28/2021   Feeding yourself No Help Needed   Getting from bed to chair No Help Needed   Getting dressed No Help Needed   Bathing or showering No Help Needed   Walk across the room (includes cane/walker) No Help Needed   Using the telphone No Help Needed   Taking your medications No Help Needed   Preparing meals No Help Needed   Managing money (expenses/bills) No Help Needed   Moderately strenuous housework (laundry) No Help Needed   Shopping for personal items (toiletries/medicines) No Help Needed   Shopping for groceries No Help Needed   Driving No Help Needed   Climbing a flight of stairs No Help Needed   Getting to places beyond walking distances No Help Needed     [facility] CARE  OFFICE PROCEDURE PROGRESS NOTE        Chart reviewed for the following:   Constantino Ferreira LPN, have reviewed the History, Physical and updated the Allergic reactions for [address] performed immediately prior to start of procedure:   Constantino Ferreira LPN, have performed the following reviews on Suly Christie prior to the start of the procedure:            * Patient was identified by name and date of birth   * Agreement on procedure being performed was verified  * Risks and Benefits explained to the patient by Dr. Jeremie San  * Procedure site verified and marked as necessary  * Patient was positioned for comfort  * Consent was signed and verified     Time: 3:00 PM  LEFT SHOULDER      Date of procedure: 10/3/2022    Procedure performed by:  Gaby Sterling MD    Provider assisted by:  Rajat Osborne. ALICE Patel    Patient assisted by: Lc Patel LPN    How tolerated by patient: Well    Post Procedural Pain Scale: 4/10    Comments: None    Dr. Jeremie San did the procedure      [facility]  OFFICE PROCEDURE PROGRESS NOTE        Chart reviewed for the following:   Constantino Ferreira LPN, have reviewed the History, Physical and updated the Allergic reactions for [address] performed immediately prior to start of procedure:   Constantino Ferreira LPN, have performed the following reviews on Suly Christie prior to the start of the procedure:            * Patient was identified by name and date of birth   * Agreement on procedure being performed was verified  * Risks and Benefits explained to the patient Dr. Jeremie San did the procedure  * Procedure site verified and marked as necessary  * Patient was positioned for comfort  * Consent was signed and verified     Time: 3:15PM  CHEST CYST      Date of procedure: 10/3/2022    Procedure performed by:  Rosalva Joya MD    Provider assisted by:  Aurora Keating. ALICE Patel    Patient assisted by: Bernadine Patel LPN    How tolerated by patient: Well    Post Procedural Pain Scale: 4/10    Comments: None    Dr. Jannette Wyman did the procedure

## 2022-10-03 NOTE — PROGRESS NOTES
HISTORY OF PRESENT ILLNESS  Telly Casillas is a 49 y.o. male. Cyst  The history is provided by the Patient. This is a new problem. The current episode started more than 1 week ago. The problem occurs daily. The problem has been gradually worsening. Associated symptoms include chest pain. Pertinent negatives include no shortness of breath. Associated symptoms comments: At cyst.   Shoulder Pain   The incident occurred more than 1 week ago. There was no injury mechanism. The left shoulder is affected. The pain has been Intermittent since onset. Cyst in chest has grown substantially over the last week and now hurts. No trauma to it had not bothered him until recently. He has had shoulder injections previously which have helped. No fall or injury  Says old football injuries. Review of Systems   Respiratory:  Negative for shortness of breath. Cardiovascular:  Positive for chest pain. No Known Allergies  Social History     Socioeconomic History    Marital status:      Spouse name: Not on file    Number of children: Not on file    Years of education: Not on file    Highest education level: Not on file   Occupational History    Not on file   Tobacco Use    Smoking status: Some Days     Types: Cigars    Smokeless tobacco: Never    Tobacco comments:     2 per mo cigar   Substance and Sexual Activity    Alcohol use:  Yes     Alcohol/week: 1.0 standard drink     Types: 1 Shots of liquor per week     Comment: occ    Drug use: Never    Sexual activity: Yes     Partners: Female   Other Topics Concern    Not on file   Social History Narrative    Not on file     Social Determinants of Health     Financial Resource Strain: Not on file   Food Insecurity: Not on file   Transportation Needs: Not on file   Physical Activity: Not on file   Stress: Not on file   Social Connections: Not on file   Intimate Partner Violence: Not on file   Housing Stability: Not on file       Physical Exam  Visit Vitals  /89 (BP 1 Location: Left arm, BP Patient Position: Sitting, BP Cuff Size: Adult)   Pulse 78   Temp 98.4 °F (36.9 °C) (Temporal)   Resp 16   Ht 6' (1.829 m)   Wt (!) 364 lb (165.1 kg)   SpO2 95%   BMI 49.37 kg/m²     Boil chest region 8 cm redness 4 cm  . Left shoulder grossly normal positive impingement  ASSESSMENT and PLAN  Encounter Diagnoses   Name Primary? Cellulitis and abscess of trunk Yes    Left shoulder tendonitis     Encounter for immunization      Orders Placed This Encounter    DRAIN/INJECT LARGE JOINT/BURSA    DRAIN SKIN ABSCESS SIMPLE    THER/PROPH/DIAG INJECTION, SUBCUT/IM    INFLUENZA VIRUS VACCINE, PRESERVATIVE FREE SYRINGE, 3 YRS AND OLDER    cephALEXin (KEFLEX) 500 mg capsule    triamcinolone acetonide (KENALOG-40) 40 mg/mL injection 80 mg   Options discussed. Discussed possible side affects and benefits of the procedure and/or medications. The patient agrees to undergo the procedure. Consent obtained. Sterile procedure followed. There were no complications and the procedure was well tolerated. Instructed patient to call me if it is ineffective or if there are any complications like increasing pain, redness or swelling. The area was cleaned with alcohol. It was numbed with HurriCaine spray. A incision was made over the area. It drained pus  yes, serosanguinous fluid , yes . A pressure dressing was applied. The boil was packed  no. A wound culture was done  no. Anibiotics were given  yes. The procedure was well tolerated. The left shoulder was assessed and landmarks palpated and marked. The shoulder was prepped with alcohol. Syringe was prepared with 2 cc of kenalog and 8cc of lidocaine. The shoulder was injected using a posterior approach. Patient was notified of signs to look for in terms of post injection complications, like increasing redness or swelling or pain.   Follow-up as needed

## 2022-12-09 LAB
BUN SERPL-MCNC: 13 MG/DL (ref 6–24)
BUN/CREAT SERPL: 15 (ref 9–20)
CALCIUM SERPL-MCNC: 9.2 MG/DL (ref 8.7–10.2)
CHLORIDE SERPL-SCNC: 102 MMOL/L (ref 96–106)
CO2 SERPL-SCNC: 24 MMOL/L (ref 20–29)
CREAT SERPL-MCNC: 0.87 MG/DL (ref 0.76–1.27)
EGFR: 102 ML/MIN/1.73
EST. AVERAGE GLUCOSE BLD GHB EST-MCNC: 157 MG/DL
GLUCOSE SERPL-MCNC: 116 MG/DL (ref 70–99)
HBA1C MFR BLD: 7.1 % (ref 4.8–5.6)
POTASSIUM SERPL-SCNC: 4.5 MMOL/L (ref 3.5–5.2)
SODIUM SERPL-SCNC: 143 MMOL/L (ref 134–144)

## 2022-12-14 ENCOUNTER — OFFICE VISIT (OUTPATIENT)
Dept: INTERNAL MEDICINE CLINIC | Age: 57
End: 2022-12-14
Payer: COMMERCIAL

## 2022-12-14 VITALS
SYSTOLIC BLOOD PRESSURE: 135 MMHG | HEIGHT: 72 IN | BODY MASS INDEX: 42.66 KG/M2 | WEIGHT: 315 LBS | TEMPERATURE: 98.4 F | DIASTOLIC BLOOD PRESSURE: 89 MMHG | RESPIRATION RATE: 18 BRPM | HEART RATE: 87 BPM | OXYGEN SATURATION: 95 %

## 2022-12-14 DIAGNOSIS — I10 ESSENTIAL HYPERTENSION: ICD-10-CM

## 2022-12-14 DIAGNOSIS — E11.65 TYPE 2 DIABETES MELLITUS WITH HYPERGLYCEMIA, WITHOUT LONG-TERM CURRENT USE OF INSULIN (HCC): Primary | ICD-10-CM

## 2022-12-14 DIAGNOSIS — Z23 ENCOUNTER FOR IMMUNIZATION: ICD-10-CM

## 2022-12-14 DIAGNOSIS — M10.00 IDIOPATHIC GOUT, UNSPECIFIED CHRONICITY, UNSPECIFIED SITE: ICD-10-CM

## 2022-12-14 DIAGNOSIS — E78.2 MIXED HYPERLIPIDEMIA: ICD-10-CM

## 2022-12-14 DIAGNOSIS — Z12.5 SCREENING FOR PROSTATE CANCER: ICD-10-CM

## 2022-12-14 RX ORDER — ALLOPURINOL 100 MG/1
100 TABLET ORAL DAILY
Qty: 90 TABLET | Refills: 3 | Status: SHIPPED | OUTPATIENT
Start: 2022-12-14

## 2022-12-14 NOTE — PROGRESS NOTES
Chief Complaint   Patient presents with    Routine     3 mth FU     Patient has not been out of the country in (14 months), NO diarrhea, NO cough, NO chest conjestion, NO temp. Pt has not been around anyone with these symptoms. Health Maintenance reviewed. I have reviewed the patient's medical history in detail and updated the computerized patient record. 1. \"Have you been to the ER, urgent care clinic since your last visit? No  Hospitalized since your last visit? \" no    2. \"Have you seen or consulted any other health care providers outside of the [address] [place] [address] since your last visit? \"  no    3. For patients aged 39-70: Has the patient had a colonoscopy / FIT/ Cologuard? yes    t to discuss pt's wishes with spouse/partner/family and bring them in the next appt to follow thru with the Advanced Directive    @  [facility], last 12 mths 11/10/2020   Able to walk? Yes   Fall in past 12 months? No       3 most recent PHQ Screens 3/10/2022   Little interest or pleasure in doing things Several days   Feeling down, depressed, irritable, or hopeless Several days   Total Score PHQ 2 2       Abuse Screening Questionnaire 5/28/2021   Do you ever feel afraid of your partner? N   Are you in a relationship with someone who physically or mentally threatens you? N   Is it safe for you to go home?  Y       ADL Assessment 5/28/2021   Feeding yourself No Help Needed   Getting from bed to chair No Help Needed   Getting dressed No Help Needed   Bathing or showering No Help Needed   Walk across the room (includes cane/walker) No Help Needed   Using the telphone No Help Needed   Taking your medications No Help Needed   Preparing meals No Help Needed   Managing money (expenses/bills) No Help Needed   Moderately strenuous housework (laundry) No Help Needed   Shopping for personal items (toiletries/medicines) No Help Needed   Shopping for groceries No Help Needed   Driving No Help Needed   Climbing a flight of stairs No Help Needed   Getting to places beyond walking distances No Help Needed

## 2022-12-14 NOTE — PROGRESS NOTES
Subjective:     Bakari Barbosa is a 56 y.o. male seen for follow-up of diabetes. He has had hypoglycemic attacks. .no  Blood sugar control has been ok    Lab Results   Component Value Date/Time    Hemoglobin A1c 7.1 (H) 12/08/2022 01:52 PM    Hemoglobin A1c 7.5 (H) 05/25/2022 02:48 PM    Hemoglobin A1c 7.5 (H) 11/24/2021 12:42 PM    Glucose 116 (H) 12/08/2022 01:52 PM    Microalb/Creat ratio (ug/mg creat.) 13 05/25/2022 02:48 PM    LDL, calculated 80 05/25/2022 02:48 PM    LDL, calculated 66 06/24/2020 01:06 PM    Creatinine 0.87 12/08/2022 01:52 PM       He has diabetes and hypertension. Bakari Barbosa has the additional concern of notes significant weight loss since starting Synjardy    Reports taking blood pressure medications without side affects. No complaints of exertional chest pain, excessive shortness of breath or focal weakness. Minimal swelling in lower legs or dizziness with standing. Diet and Lifestyle: Follows diabetic diet, occasional cigar    Patient Active Problem List    Diagnosis Date Noted    Gout 05/28/2021    Obesity, morbid ([facility] 02/18/2020    Hyperglycemia due to type 2 diabetes mellitus ([facility] 02/18/2020    Essential hypertension 02/18/2020     Current Outpatient Medications   Medication Sig Dispense Refill    allopurinoL (ZYLOPRIM) 100 mg tablet Take 1 Tablet by mouth daily. 90 Tablet 3    lisinopriL (PRINIVIL, ZESTRIL) 20 mg tablet Take 1 Tablet by mouth in the morning. 90 Tablet 3    sildenafil citrate (VIAGRA) 50 mg tablet Take 100 mg by mouth daily as needed. empagliflozin-metFORMIN (Synjardy) 5-500 mg per tablet Take 1 Tablet by mouth two (2) times daily (with meals). 180 Tablet 3    multivitamin, tx-iron-ca-min (THERA-M w/ IRON) 9 mg iron-400 mcg tab tablet Take 1 Tab by mouth daily. acetaminophen (TYLENOL) 325 mg tablet Take 650 mg by mouth every four (4) hours as needed for Pain. ascorbic acid, vitamin C, (VITAMIN C) 250 mg tablet Take  by mouth. No Known Allergies  Past Medical History:   Diagnosis Date    Diabetes ([facility]     Headache     Hypertension     Thyroid disease      Social History     Tobacco Use    Smoking status: Some Days     Types: Cigars    Smokeless tobacco: Never    Tobacco comments:     2 per mo cigar   Substance Use Topics    Alcohol use: Yes     Alcohol/week: 1.0 standard drink     Types: 1 Shots of liquor per week     Comment: occ          Review of Systems  Pertinent items are noted in HPI. Objective:     Significant for the following: ow 16 pound weight loss  Visit Vitals  /89 (BP 1 Location: Left arm, BP Patient Position: Sitting, BP Cuff Size: Adult)   Pulse 87   Temp 98.4 °F (36.9 °C) (Temporal)   Resp 18   Ht 6' (1.829 m)   Wt 348 lb (157.9 kg)   SpO2 95%   BMI 47.20 kg/m²     WD WN male NAD  Heart RRR without murmers clicks or rubs  Lungs CTA  Abdo soft nontender  Ext no edema      Lab review: labs reviewed, I note that glycosylated hemoglobin mildly abnormal but acceptable. Assessment/Plan:     Follow-up diabetes well controlled, stable. Diabetic issues reviewed with him: all medications, side effects and compliance discussed carefully and annual eye examinations at Ophthalmology discussed. ICD-10-CM ICD-9-CM    1.  Type 2 diabetes mellitus with hyperglycemia, without long-term current use of insulin (HCC)  E11.65 250.00 REFERRAL TO OPTOMETRY     790.29 HEMOGLOBIN A1C WITH EAG      THER/PROPH/DIAG INJECTION, SUBCUT/IM      2. Encounter for immunization  Z23 V03.89 PNEUMOCOCCAL, PCV-13, (AGE 6 WKS+), IM      THER/PROPH/DIAG INJECTION, SUBCUT/IM      3. Essential hypertension  I60 462.6 METABOLIC PANEL, BASIC      THER/PROPH/DIAG INJECTION, SUBCUT/IM      4. Mixed hyperlipidemia  E78.2 272.2 LIPID PANEL      THER/PROPH/DIAG INJECTION, SUBCUT/IM      5. Screening for prostate cancer  Z12.5 V76.44 PSA, DIAGNOSTIC (PROSTATE SPECIFIC AG)      THER/PROPH/DIAG INJECTION, SUBCUT/IM      6. Idiopathic gout, unspecified chronicity, unspecified site  M10.00 274.9 allopurinoL (ZYLOPRIM) 100 mg tablet      THER/PROPH/DIAG INJECTION, SUBCUT/IM        Orders Placed This Encounter    THER/PROPH/DIAG INJECTION, SUBCUT/IM    PNEUMOCOCCAL, PCV-13, (AGE 6 WKS+), IM    LIPID PANEL     Standing Status:   Future     Standing Expiration Date:   3/57/2715    METABOLIC PANEL, BASIC     Standing Status:   Future     Standing Expiration Date:   6/16/2023    PROSTATE SPECIFIC AG     Standing Status:   Future     Standing Expiration Date:   6/14/2023    HEMOGLOBIN A1C WITH EAG     Standing Status:   Future     Standing Expiration Date:   12/14/2023    REFERRAL TO OPTOMETRY     Referral Priority:   Routine     Referral Type:   Consultation     Referral Reason:   Specialty Services Required     Referred to Provider:   Efrem Pineda OD    allopurinoL (ZYLOPRIM) 100 mg tablet     Sig: Take 1 Tablet by mouth daily. Dispense:  90 Tablet     Refill:  3     Current Outpatient Medications   Medication Sig Dispense Refill    allopurinoL (ZYLOPRIM) 100 mg tablet Take 1 Tablet by mouth daily. 90 Tablet 3    lisinopriL (PRINIVIL, ZESTRIL) 20 mg tablet Take 1 Tablet by mouth in the morning. 90 Tablet 3    sildenafil citrate (VIAGRA) 50 mg tablet Take 100 mg by mouth daily as needed. empagliflozin-metFORMIN (Synjardy) 5-500 mg per tablet Take 1 Tablet by mouth two (2) times daily (with meals). 180 Tablet 3    multivitamin, tx-iron-ca-min (THERA-M w/ IRON) 9 mg iron-400 mcg tab tablet Take 1 Tab by mouth daily. acetaminophen (TYLENOL) 325 mg tablet Take 650 mg by mouth every four (4) hours as needed for Pain. ascorbic acid, vitamin C, (VITAMIN C) 250 mg tablet Take  by mouth. Follow-up 6 months        Chronic Conditions Addressed Today       1. Gout     Relevant Medications     allopurinoL (ZYLOPRIM) 100 mg tablet     Other Relevant Orders     THER/PROPH/DIAG INJECTION, SUBCUT/IM    2.  Hyperglycemia due to type 2 diabetes mellitus ([facility] - Primary     Relevant Orders     REFERRAL TO OPTOMETRY     HEMOGLOBIN A1C WITH EAG     THER/PROPH/DIAG INJECTION, SUBCUT/IM    3. Essential hypertension     Relevant Orders     METABOLIC PANEL, BASIC     THER/PROPH/DIAG INJECTION, SUBCUT/IM     Acute Diagnoses Addressed Today       Encounter for immunization            Relevant Orders        PNEUMOCOCCAL, PCV-13, (AGE 6 WKS+), IM (Completed)        THER/PROPH/DIAG INJECTION, SUBCUT/IM    Mixed hyperlipidemia            Relevant Orders        LIPID PANEL        THER/PROPH/DIAG INJECTION, SUBCUT/IM    Screening for prostate cancer            Relevant Orders        PSA, DIAGNOSTIC (PROSTATE SPECIFIC AG)        THER/PROPH/DIAG INJECTION, SUBCUT/IM

## 2023-05-25 RX ORDER — ACETAMINOPHEN 325 MG/1
650 TABLET ORAL EVERY 4 HOURS PRN
COMMUNITY

## 2023-05-25 RX ORDER — ASCORBIC ACID 250 MG
TABLET ORAL
COMMUNITY

## 2023-05-25 RX ORDER — ALLOPURINOL 100 MG/1
100 TABLET ORAL DAILY
COMMUNITY
Start: 2022-12-14 | End: 2023-06-06 | Stop reason: SDUPTHER

## 2023-05-25 RX ORDER — LISINOPRIL 20 MG/1
20 TABLET ORAL DAILY
COMMUNITY
Start: 2022-08-02 | End: 2023-06-06 | Stop reason: SDUPTHER

## 2023-05-25 RX ORDER — SILDENAFIL 50 MG/1
100 TABLET, FILM COATED ORAL DAILY PRN
COMMUNITY
Start: 2022-06-07

## 2023-05-25 RX ORDER — EMPAGLIFLOZIN AND METFORMIN HYDROCHLORIDE 5; 500 MG/1; MG/1
1 TABLET ORAL 2 TIMES DAILY WITH MEALS
COMMUNITY
Start: 2022-06-15 | End: 2023-06-06 | Stop reason: SDUPTHER

## 2023-06-02 LAB
BUN SERPL-MCNC: 12 MG/DL (ref 6–24)
BUN/CREAT SERPL: 15 (ref 9–20)
CALCIUM SERPL-MCNC: 9.5 MG/DL (ref 8.7–10.2)
CHLORIDE SERPL-SCNC: 99 MMOL/L (ref 96–106)
CHOLEST SERPL-MCNC: 161 MG/DL (ref 100–199)
CO2 SERPL-SCNC: 26 MMOL/L (ref 20–29)
CREAT SERPL-MCNC: 0.81 MG/DL (ref 0.76–1.27)
EGFRCR SERPLBLD CKD-EPI 2021: 104 ML/MIN/1.73
EST. AVERAGE GLUCOSE BLD GHB EST-MCNC: 169 MG/DL
GLUCOSE SERPL-MCNC: 147 MG/DL (ref 70–99)
HBA1C MFR BLD: 7.5 % (ref 4.8–5.6)
HDLC SERPL-MCNC: 60 MG/DL
IMP & REVIEW OF LAB RESULTS: NORMAL
LDLC SERPL CALC-MCNC: 79 MG/DL (ref 0–99)
POTASSIUM SERPL-SCNC: 5 MMOL/L (ref 3.5–5.2)
PSA SERPL-MCNC: 1 NG/ML (ref 0–4)
SODIUM SERPL-SCNC: 140 MMOL/L (ref 134–144)
TRIGL SERPL-MCNC: 128 MG/DL (ref 0–149)
VLDLC SERPL CALC-MCNC: 22 MG/DL (ref 5–40)

## 2023-06-06 ENCOUNTER — OFFICE VISIT (OUTPATIENT)
Facility: CLINIC | Age: 58
End: 2023-06-06
Payer: COMMERCIAL

## 2023-06-06 VITALS
OXYGEN SATURATION: 99 % | HEART RATE: 93 BPM | WEIGHT: 315 LBS | BODY MASS INDEX: 42.66 KG/M2 | HEIGHT: 72 IN | TEMPERATURE: 97.1 F | SYSTOLIC BLOOD PRESSURE: 131 MMHG | RESPIRATION RATE: 18 BRPM | DIASTOLIC BLOOD PRESSURE: 85 MMHG

## 2023-06-06 DIAGNOSIS — I10 ESSENTIAL HYPERTENSION: ICD-10-CM

## 2023-06-06 DIAGNOSIS — Z00.00 WELLNESS EXAMINATION: Primary | ICD-10-CM

## 2023-06-06 DIAGNOSIS — E11.65 TYPE 2 DIABETES MELLITUS WITH HYPERGLYCEMIA, WITHOUT LONG-TERM CURRENT USE OF INSULIN (HCC): ICD-10-CM

## 2023-06-06 DIAGNOSIS — M10.9 GOUT INVOLVING TOE, UNSPECIFIED CAUSE, UNSPECIFIED CHRONICITY, UNSPECIFIED LATERALITY: ICD-10-CM

## 2023-06-06 PROCEDURE — 3078F DIAST BP <80 MM HG: CPT | Performed by: FAMILY MEDICINE

## 2023-06-06 PROCEDURE — 3074F SYST BP LT 130 MM HG: CPT | Performed by: FAMILY MEDICINE

## 2023-06-06 PROCEDURE — 99396 PREV VISIT EST AGE 40-64: CPT | Performed by: FAMILY MEDICINE

## 2023-06-06 RX ORDER — LISINOPRIL 20 MG/1
20 TABLET ORAL DAILY
Qty: 90 TABLET | Refills: 3 | Status: SHIPPED | OUTPATIENT
Start: 2023-06-06

## 2023-06-06 RX ORDER — ALLOPURINOL 100 MG/1
100 TABLET ORAL DAILY
Qty: 90 TABLET | Refills: 3 | Status: SHIPPED | OUTPATIENT
Start: 2023-06-06

## 2023-06-06 RX ORDER — EMPAGLIFLOZIN AND METFORMIN HYDROCHLORIDE 5; 500 MG/1; MG/1
1 TABLET ORAL 2 TIMES DAILY WITH MEALS
Qty: 180 TABLET | Refills: 3 | Status: SHIPPED | OUTPATIENT
Start: 2023-06-06

## 2023-06-06 SDOH — ECONOMIC STABILITY: FOOD INSECURITY: WITHIN THE PAST 12 MONTHS, YOU WORRIED THAT YOUR FOOD WOULD RUN OUT BEFORE YOU GOT MONEY TO BUY MORE.: NEVER TRUE

## 2023-06-06 SDOH — ECONOMIC STABILITY: INCOME INSECURITY: HOW HARD IS IT FOR YOU TO PAY FOR THE VERY BASICS LIKE FOOD, HOUSING, MEDICAL CARE, AND HEATING?: NOT HARD AT ALL

## 2023-06-06 SDOH — ECONOMIC STABILITY: FOOD INSECURITY: WITHIN THE PAST 12 MONTHS, THE FOOD YOU BOUGHT JUST DIDN'T LAST AND YOU DIDN'T HAVE MONEY TO GET MORE.: NEVER TRUE

## 2023-06-06 SDOH — ECONOMIC STABILITY: HOUSING INSECURITY
IN THE LAST 12 MONTHS, WAS THERE A TIME WHEN YOU DID NOT HAVE A STEADY PLACE TO SLEEP OR SLEPT IN A SHELTER (INCLUDING NOW)?: NO

## 2023-06-06 ASSESSMENT — PATIENT HEALTH QUESTIONNAIRE - PHQ9
SUM OF ALL RESPONSES TO PHQ QUESTIONS 1-9: 0
1. LITTLE INTEREST OR PLEASURE IN DOING THINGS: 0
SUM OF ALL RESPONSES TO PHQ9 QUESTIONS 1 & 2: 0
SUM OF ALL RESPONSES TO PHQ QUESTIONS 1-9: 0
2. FEELING DOWN, DEPRESSED OR HOPELESS: 0

## 2023-06-06 NOTE — PROGRESS NOTES
Room: 2  I have reviewed all needed documentation in preparation for visit. Verified patient by name and date of birth  Chief Complaint   Patient presents with    Follow-up     6 months       Vitals:    06/06/23 0759 06/06/23 0805   BP: (!) 141/83 131/85   Site: Left Upper Arm Left Upper Arm   Position: Sitting Sitting   Cuff Size: Medium Adult Medium Adult   Pulse: 93 93   Resp: 18    Temp: 97.1 °F (36.2 °C)    TempSrc: Temporal    SpO2: 99%    Weight: (!) 365 lb (165.6 kg)    Height: 6' (1.829 m)         Health Maintenance Due   Topic Date Due    Diabetic retinal exam  Never done    Hepatitis B vaccine (1 of 3 - Risk 3-dose series) Never done    DTaP/Tdap/Td vaccine (1 - Tdap) Never done    COVID-19 Vaccine (4 - Booster for John Paul series) 12/28/2022    Depression Screen  03/10/2023    Diabetic Alb to Cr ratio (uACR) test  05/25/2023    Diabetic foot exam  06/15/2023        1. \"Have you been to the ER, urgent care clinic since your last visit? Hospitalized since your last visit? \" no    2. \"Have you seen or consulted any other health care providers outside of the [address] [place] [address] since your last visit? \" No     3. For patients aged 39-70: Has the patient had a colonoscopy / FIT/ Cologuard? No gap present      If the patient is female:    4. For patients aged 41-77: Has the patient had a mammogram within the past 2 years? N/a      5. For patients aged 21-65: Has the patient had a pap smear?  N/a

## 2023-08-18 ENCOUNTER — TELEPHONE (OUTPATIENT)
Facility: CLINIC | Age: 58
End: 2023-08-18

## 2023-08-18 NOTE — TELEPHONE ENCOUNTER
Pt calling to see if Dr Ruben Kee can give cortisone shot in L side for sciatic pain. If not, where can he go to this done?  Please call him at  [phone]

## 2023-08-21 PROBLEM — E66.01 OBESITY, MORBID (HCC): Status: RESOLVED | Noted: 2020-02-18 | Resolved: 2023-08-21

## 2023-08-21 NOTE — PROGRESS NOTES
Subjective:     Vivi Robertson is a 56 y.o. male seen for follow-up of diabetes. He has had hypoglycemic attacks. .no  Blood sugar control has been ok    Hemoglobin A1C   Date Value Ref Range Status   06/01/2023 7.5 (H) 4.8 - 5.6 % Final     Comment:              Prediabetes: 5.7 - 6.4           Diabetes: >6.4           Glycemic control for adults with diabetes: <7.0       Hemoglobin A1C, POC   Date Value Ref Range Status   05/28/2021 7.5 % Final     Lab Results   Component Value Date/Time     06/01/2023 11:13 AM    K 5.0 06/01/2023 11:13 AM    CL 99 06/01/2023 11:13 AM    CO2 26 06/01/2023 11:13 AM    BUN 12 06/01/2023 11:13 AM    CREATININE 0.81 06/01/2023 11:13 AM    GLUCOSE 147 06/01/2023 11:13 AM    CALCIUM 9.5 06/01/2023 11:13 AM    LABGLOM 104 06/01/2023 11:13 AM          He has diabetes, hypertension, and hyperlipidemia. Vivi Robertson has the additional concern of left hip back pain, Dx with sciatica, Sx x a month or 3. Starts in low back goes down into the foot on the left. No injury. Previous recommendation was for him to get a shot but there were insurance issues. Does not think it is the hip that is the problem. Patient does not complain about: fever, weight loss, recent fecal or urine incontinence, known active cancer, focal paralysis, recent back orthopaedic or other procedure. Reports taking blood pressure medications without side affects. No complaints of exertional chest pain, excessive shortness of breath or focal weakness. Minimal swelling in lower legs or dizziness with standing. Diet and Lifestyle:  no tob .     Patient Active Problem List    Diagnosis Date Noted    Body mass index (BMI) 45.0-49.9, adult ([facility]) 06/06/2023    Gout 05/28/2021    Essential hypertension 02/18/2020    Hyperglycemia due to type 2 diabetes mellitus ([address] [place] [address]) 02/18/2020     No Known Allergies  Past Medical History:   Diagnosis Date    Diabetes ([facility])     Headache     Hypertension     Thyroid

## 2023-08-22 ENCOUNTER — OFFICE VISIT (OUTPATIENT)
Facility: CLINIC | Age: 58
End: 2023-08-22
Payer: COMMERCIAL

## 2023-08-22 VITALS
HEIGHT: 72 IN | WEIGHT: 315 LBS | RESPIRATION RATE: 18 BRPM | SYSTOLIC BLOOD PRESSURE: 131 MMHG | DIASTOLIC BLOOD PRESSURE: 85 MMHG | BODY MASS INDEX: 42.66 KG/M2 | OXYGEN SATURATION: 96 % | HEART RATE: 85 BPM | TEMPERATURE: 98.6 F

## 2023-08-22 DIAGNOSIS — M54.42 LEFT-SIDED LOW BACK PAIN WITH LEFT-SIDED SCIATICA, UNSPECIFIED CHRONICITY: Primary | ICD-10-CM

## 2023-08-22 DIAGNOSIS — M54.32 SCIATICA OF LEFT SIDE: ICD-10-CM

## 2023-08-22 DIAGNOSIS — I10 ESSENTIAL HYPERTENSION: ICD-10-CM

## 2023-08-22 DIAGNOSIS — E11.65 TYPE 2 DIABETES MELLITUS WITH HYPERGLYCEMIA, WITHOUT LONG-TERM CURRENT USE OF INSULIN (HCC): ICD-10-CM

## 2023-08-22 LAB
BILIRUBIN, URINE, POC: NEGATIVE
BLOOD URINE, POC: NORMAL
GLUCOSE URINE, POC: 500
KETONES, URINE, POC: NEGATIVE
LEUKOCYTE ESTERASE, URINE, POC: NEGATIVE
NITRITE, URINE, POC: NEGATIVE
PH, URINE, POC: 6.5 (ref 4.6–8)
PROTEIN,URINE, POC: NEGATIVE
SPECIFIC GRAVITY, URINE, POC: 1.01 (ref 1–1.03)
URINALYSIS CLARITY, POC: CLEAR
URINALYSIS COLOR, POC: YELLOW
UROBILINOGEN, POC: NORMAL

## 2023-08-22 PROCEDURE — 81003 URINALYSIS AUTO W/O SCOPE: CPT | Performed by: FAMILY MEDICINE

## 2023-08-22 PROCEDURE — 99214 OFFICE O/P EST MOD 30 MIN: CPT | Performed by: FAMILY MEDICINE

## 2023-08-22 PROCEDURE — 3051F HG A1C>EQUAL 7.0%<8.0%: CPT | Performed by: FAMILY MEDICINE

## 2023-08-22 PROCEDURE — 3074F SYST BP LT 130 MM HG: CPT | Performed by: FAMILY MEDICINE

## 2023-08-22 PROCEDURE — 3078F DIAST BP <80 MM HG: CPT | Performed by: FAMILY MEDICINE

## 2023-08-22 RX ORDER — CYCLOBENZAPRINE HCL 5 MG
5 TABLET ORAL 3 TIMES DAILY PRN
Qty: 30 TABLET | Refills: 0 | Status: SHIPPED | OUTPATIENT
Start: 2023-08-22 | End: 2023-09-01

## 2023-08-22 ASSESSMENT — PATIENT HEALTH QUESTIONNAIRE - PHQ9
1. LITTLE INTEREST OR PLEASURE IN DOING THINGS: 0
SUM OF ALL RESPONSES TO PHQ9 QUESTIONS 1 & 2: 0
SUM OF ALL RESPONSES TO PHQ QUESTIONS 1-9: 0
2. FEELING DOWN, DEPRESSED OR HOPELESS: 0

## 2023-08-22 NOTE — PROGRESS NOTES
Chief Complaint   Patient presents with    Back Pain     Sciatica pain L/hip and buttock  Pt wants an injection       Patient has not been out of the country in (14 months), NO diarrhea, NO cough, NO chest conjestion, NO temp. Pt has not been around anyone with these symptoms. Health Maintenance reviewed. I have reviewed the patient's medical history in detail and updated the computerized patient record. 1. \"Have you been to the ER, urgent care clinic since your last visit? No  Hospitalized since your last visit? \" no    2. \"Have you seen or consulted any other health care providers outside of the [address] [place] [address] since your last visit? \" no     3. For patients aged 43-73: Has the patient had a colonoscopy / FIT/ Cologuard?  no

## 2024-05-22 RX ORDER — ALLOPURINOL 100 MG/1
100 TABLET ORAL DAILY
Qty: 90 TABLET | Refills: 0 | Status: SHIPPED | OUTPATIENT
Start: 2024-05-22

## 2024-06-01 LAB
BUN SERPL-MCNC: 11 MG/DL (ref 6–24)
BUN/CREAT SERPL: 15 (ref 9–20)
CALCIUM SERPL-MCNC: 9.6 MG/DL (ref 8.7–10.2)
CHLORIDE SERPL-SCNC: 101 MMOL/L (ref 96–106)
CO2 SERPL-SCNC: 25 MMOL/L (ref 20–29)
CREAT SERPL-MCNC: 0.72 MG/DL (ref 0.76–1.27)
EGFRCR SERPLBLD CKD-EPI 2021: 107 ML/MIN/1.73
GLUCOSE SERPL-MCNC: 123 MG/DL (ref 70–99)
HBA1C MFR BLD: 8.1 % (ref 4.8–5.6)
POTASSIUM SERPL-SCNC: 4.7 MMOL/L (ref 3.5–5.2)
SODIUM SERPL-SCNC: 142 MMOL/L (ref 134–144)

## 2024-06-05 PROBLEM — E11.65 TYPE 2 DIABETES MELLITUS WITH HYPERGLYCEMIA, WITHOUT LONG-TERM CURRENT USE OF INSULIN (HCC): Status: ACTIVE | Noted: 2020-02-18

## 2024-06-06 ENCOUNTER — OFFICE VISIT (OUTPATIENT)
Facility: CLINIC | Age: 59
End: 2024-06-06
Payer: COMMERCIAL

## 2024-06-06 VITALS
OXYGEN SATURATION: 97 % | RESPIRATION RATE: 17 BRPM | HEIGHT: 72 IN | BODY MASS INDEX: 42.66 KG/M2 | HEART RATE: 84 BPM | WEIGHT: 315 LBS | SYSTOLIC BLOOD PRESSURE: 132 MMHG | DIASTOLIC BLOOD PRESSURE: 86 MMHG | TEMPERATURE: 98.1 F

## 2024-06-06 DIAGNOSIS — E11.65 TYPE 2 DIABETES MELLITUS WITH HYPERGLYCEMIA, WITHOUT LONG-TERM CURRENT USE OF INSULIN (HCC): Primary | ICD-10-CM

## 2024-06-06 DIAGNOSIS — Z86.79 HISTORY OF ATRIAL FIBRILLATION: ICD-10-CM

## 2024-06-06 DIAGNOSIS — M10.00 IDIOPATHIC GOUT, UNSPECIFIED CHRONICITY, UNSPECIFIED SITE: ICD-10-CM

## 2024-06-06 DIAGNOSIS — K29.00 ACUTE GASTRITIS, PRESENCE OF BLEEDING UNSPECIFIED, UNSPECIFIED GASTRITIS TYPE: ICD-10-CM

## 2024-06-06 DIAGNOSIS — I10 ESSENTIAL HYPERTENSION: ICD-10-CM

## 2024-06-06 DIAGNOSIS — E78.2 MIXED HYPERLIPIDEMIA: ICD-10-CM

## 2024-06-06 DIAGNOSIS — Z12.11 COLON CANCER SCREENING: ICD-10-CM

## 2024-06-06 DIAGNOSIS — R06.00 DYSPNEA, UNSPECIFIED TYPE: ICD-10-CM

## 2024-06-06 PROCEDURE — 3075F SYST BP GE 130 - 139MM HG: CPT | Performed by: FAMILY MEDICINE

## 2024-06-06 PROCEDURE — 3052F HG A1C>EQUAL 8.0%<EQUAL 9.0%: CPT | Performed by: FAMILY MEDICINE

## 2024-06-06 PROCEDURE — 99214 OFFICE O/P EST MOD 30 MIN: CPT | Performed by: FAMILY MEDICINE

## 2024-06-06 PROCEDURE — 3079F DIAST BP 80-89 MM HG: CPT | Performed by: FAMILY MEDICINE

## 2024-06-06 SDOH — ECONOMIC STABILITY: FOOD INSECURITY: WITHIN THE PAST 12 MONTHS, YOU WORRIED THAT YOUR FOOD WOULD RUN OUT BEFORE YOU GOT MONEY TO BUY MORE.: NEVER TRUE

## 2024-06-06 SDOH — ECONOMIC STABILITY: INCOME INSECURITY: HOW HARD IS IT FOR YOU TO PAY FOR THE VERY BASICS LIKE FOOD, HOUSING, MEDICAL CARE, AND HEATING?: NOT HARD AT ALL

## 2024-06-06 SDOH — ECONOMIC STABILITY: FOOD INSECURITY: WITHIN THE PAST 12 MONTHS, THE FOOD YOU BOUGHT JUST DIDN'T LAST AND YOU DIDN'T HAVE MONEY TO GET MORE.: NEVER TRUE

## 2024-06-06 ASSESSMENT — PATIENT HEALTH QUESTIONNAIRE - PHQ9
SUM OF ALL RESPONSES TO PHQ QUESTIONS 1-9: 0
1. LITTLE INTEREST OR PLEASURE IN DOING THINGS: NOT AT ALL
2. FEELING DOWN, DEPRESSED OR HOPELESS: NOT AT ALL
SUM OF ALL RESPONSES TO PHQ QUESTIONS 1-9: 0
SUM OF ALL RESPONSES TO PHQ9 QUESTIONS 1 & 2: 0

## 2024-06-06 NOTE — PROGRESS NOTES
Chief Complaint   Patient presents with    Annual Exam         Health Maintenance Due   Topic Date Due    Hepatitis B vaccine (1 of 3 - 3-dose series) Never done    Diabetic retinal exam  Never done    DTaP/Tdap/Td vaccine (1 - Tdap) Never done    Colorectal Cancer Screen  Never done    Diabetic foot exam  06/15/2023    COVID-19 Vaccine (5 - 2023-24 season) 12/15/2023    Lipids  06/01/2024         \"Have you been to the ER, urgent care clinic since your last visit?  Hospitalized since your last visit?\"    YES - When: approximately 4 months ago.  Where and Why: [facility] ED.    “Have you seen or consulted any other health care providers outside of [facility] since your last visit?”    NO    “Have you had a colorectal cancer screening such as a colonoscopy/FIT/Cologuard?    NO    No colonoscopy on file  No cologuard on file  No FIT/FOBT on file   No flexible sigmoidoscopy on file              
  05/28/2021 7.5 % Final     Lab Results   Component Value Date/Time     05/31/2024 02:10 PM    K 4.7 05/31/2024 02:10 PM     05/31/2024 02:10 PM    CO2 25 05/31/2024 02:10 PM    BUN 11 05/31/2024 02:10 PM    CREATININE 0.72 05/31/2024 02:10 PM    GLUCOSE 123 05/31/2024 02:10 PM    CALCIUM 9.6 05/31/2024 02:10 PM    LABGLOM 107 05/31/2024 02:10 PM    LABGLOM 96 12/15/2023 01:19 PM        He has the following problems:  Patient Active Problem List   Diagnosis    Gout    Essential hypertension    Type 2 diabetes mellitus with hyperglycemia, without long-term current use of insulin ([facility])    Body mass index (BMI) 45.0-49.9, adult ([facility])      History of Present Illness  The patient is a 58-year-old male who presents for evaluation of chest pain. He has a little bit of touch of diabetes and gout.    The patient sought emergency care at [facility] on 02/28/2024 due to chest pain, which was initially attributed to heartburn. He self-medicated with over-the-counter Prilosec and completed a 14-day program. He was referred to a gastroenterologist and a cardiologist at [facility], however, he has not yet consulted a cardiologist. His medical history includes atrial fibrillation, for which he took Coumadin for 6 months, which effectively regulated his condition. He also reports experiencing shortness of breath and lightheadedness.   He quit smoking in 03/2023. He started smoking when he was 20 years old. He smoked 1 pack a week for 30 years. He used to smoke 3 to 4 cigarettes a day. He used to smoke 5 to 7 cigars a week.  Inquires whether he might qualify for disability.  Social History     Socioeconomic History    Marital status:      Spouse name: Not on file    Number of children: Not on file    Years of education: Not on file    Highest education level: Not on file   Occupational History    Occupation: [employer]   Tobacco Use    Smoking status: Former     Current packs/day: 0.00     Average

## 2024-06-18 RX ORDER — EMPAGLIFLOZIN AND METFORMIN HYDROCHLORIDE 5; 500 MG/1; MG/1
1 TABLET ORAL 2 TIMES DAILY WITH MEALS
Qty: 180 TABLET | Refills: 1 | Status: SHIPPED | OUTPATIENT
Start: 2024-06-18

## 2024-07-08 RX ORDER — LISINOPRIL 20 MG/1
20 TABLET ORAL DAILY
Qty: 90 TABLET | Refills: 1 | Status: SHIPPED | OUTPATIENT
Start: 2024-07-08

## 2024-07-08 NOTE — TELEPHONE ENCOUNTER
Requested Prescriptions     Pending Prescriptions Disp Refills    lisinopril (PRINIVIL;ZESTRIL) 20 MG tablet [Pharmacy Med Name: Lisinopril 20 MG Oral Tablet] 90 tablet 0     Sig: Take 1 tablet by mouth once daily     Last fill 6/6/2023 for #90 w/ 3 addtnl  Last OV 6/6/2024  Next OV 10/15/2024    Radha Husain LPN

## 2024-08-14 ENCOUNTER — TELEPHONE (OUTPATIENT)
Facility: CLINIC | Age: 59
End: 2024-08-14

## 2024-08-14 NOTE — TELEPHONE ENCOUNTER
[facility] because pt has high bmi. Please refer to [facility] or [facility]'s for this test

## 2024-08-15 NOTE — TELEPHONE ENCOUNTER
Please redo pt's referr to gastro so [facility]'s or [facility] can do the colonoscopy due to BMI

## 2024-08-16 DIAGNOSIS — Z12.11 COLON CANCER SCREENING: Primary | ICD-10-CM

## 2024-08-16 RX ORDER — ALLOPURINOL 100 MG/1
100 TABLET ORAL DAILY
Qty: 90 TABLET | Refills: 0 | Status: SHIPPED | OUTPATIENT
Start: 2024-08-16

## 2024-10-01 ENCOUNTER — HOSPITAL ENCOUNTER (OUTPATIENT)
Facility: HOSPITAL | Age: 59
Setting detail: OUTPATIENT SURGERY
Discharge: HOME | End: 2024-10-01
Attending: INTERNAL MEDICINE | Admitting: INTERNAL MEDICINE
Payer: COMMERCIAL

## 2024-10-01 ENCOUNTER — ANESTHESIA (OUTPATIENT)
Facility: HOSPITAL | Age: 59
End: 2024-10-01
Payer: COMMERCIAL

## 2024-10-01 ENCOUNTER — ANESTHESIA EVENT (OUTPATIENT)
Facility: HOSPITAL | Age: 59
End: 2024-10-01
Payer: COMMERCIAL

## 2024-10-01 VITALS
SYSTOLIC BLOOD PRESSURE: 138 MMHG | HEART RATE: 82 BPM | BODY MASS INDEX: 42.66 KG/M2 | TEMPERATURE: 97.8 F | DIASTOLIC BLOOD PRESSURE: 92 MMHG | RESPIRATION RATE: 12 BRPM | WEIGHT: 315 LBS | HEIGHT: 72 IN | OXYGEN SATURATION: 97 %

## 2024-10-01 PROCEDURE — 2580000003 HC RX 258: Performed by: INTERNAL MEDICINE

## 2024-10-01 PROCEDURE — 2500000003 HC RX 250 WO HCPCS

## 2024-10-01 PROCEDURE — 2709999900 HC NON-CHARGEABLE SUPPLY: Performed by: INTERNAL MEDICINE

## 2024-10-01 PROCEDURE — 7100000010 HC PHASE II RECOVERY - FIRST 15 MIN: Performed by: INTERNAL MEDICINE

## 2024-10-01 PROCEDURE — 3700000001 HC ADD 15 MINUTES (ANESTHESIA): Performed by: INTERNAL MEDICINE

## 2024-10-01 PROCEDURE — 6360000002 HC RX W HCPCS

## 2024-10-01 PROCEDURE — 2580000003 HC RX 258

## 2024-10-01 PROCEDURE — 3700000000 HC ANESTHESIA ATTENDED CARE: Performed by: INTERNAL MEDICINE

## 2024-10-01 PROCEDURE — 3600007512: Performed by: INTERNAL MEDICINE

## 2024-10-01 PROCEDURE — 3600007502: Performed by: INTERNAL MEDICINE

## 2024-10-01 RX ORDER — 0.9 % SODIUM CHLORIDE 0.9 %
INTRAVENOUS SOLUTION INTRAVENOUS
Status: DISCONTINUED | OUTPATIENT
Start: 2024-10-01 | End: 2024-10-01 | Stop reason: SDUPTHER

## 2024-10-01 RX ORDER — SIMETHICONE 40MG/0.6ML
40 SUSPENSION, DROPS(FINAL DOSAGE FORM)(ML) ORAL AS NEEDED
Status: DISCONTINUED | OUTPATIENT
Start: 2024-10-01 | End: 2024-10-01 | Stop reason: HOSPADM

## 2024-10-01 RX ORDER — SODIUM CHLORIDE 0.9 % (FLUSH) 0.9 %
5-40 SYRINGE (ML) INJECTION PRN
Status: DISCONTINUED | OUTPATIENT
Start: 2024-10-01 | End: 2024-10-01 | Stop reason: HOSPADM

## 2024-10-01 RX ORDER — SODIUM CHLORIDE 9 MG/ML
INJECTION, SOLUTION INTRAVENOUS CONTINUOUS
Status: DISCONTINUED | OUTPATIENT
Start: 2024-10-01 | End: 2024-10-01 | Stop reason: HOSPADM

## 2024-10-01 RX ADMIN — SODIUM CHLORIDE 800 ML: 9 INJECTION, SOLUTION INTRAVENOUS at 08:40

## 2024-10-01 RX ADMIN — PROPOFOL 450 MG: 10 INJECTION, EMULSION INTRAVENOUS at 08:40

## 2024-10-01 RX ADMIN — LIDOCAINE HYDROCHLORIDE 50 MG: 20 INJECTION, SOLUTION EPIDURAL; INFILTRATION; INTRACAUDAL; PERINEURAL at 08:15

## 2024-10-01 RX ADMIN — SODIUM CHLORIDE: 9 INJECTION, SOLUTION INTRAVENOUS at 07:44

## 2024-10-01 ASSESSMENT — PAIN - FUNCTIONAL ASSESSMENT: PAIN_FUNCTIONAL_ASSESSMENT: 0-10

## 2024-10-01 NOTE — ANESTHESIA PRE PROCEDURE
Department of Anesthesiology  Preprocedure Note       Name:  Mauri Church   Age:  58 y.o.  :  1965                                          MRN:  [id number]         Date:  10/1/2024      Surgeon: Surgeon(s):  Heidy Jiménez MD    Procedure: Procedure(s):  COLONOSCOPY    Medications prior to admission:   Prior to Admission medications    Medication Sig Start Date End Date Taking? Authorizing Provider   allopurinol (ZYLOPRIM) 100 MG tablet Take 1 tablet by mouth once daily 24   Paul Villar MD   lisinopril (PRINIVIL;ZESTRIL) 20 MG tablet Take 1 tablet by mouth once daily 24   Paul Villar MD   SYNJARDY 5-500 MG TABS TAKE 1 TABLET BY MOUTH TWICE DAILY WITH MEALS 24   Paul Villar MD   mupirocin (BACTROBAN) 2 % ointment Apply topically 3 times daily. 23   Paul Villar MD   sildenafil (VIAGRA) 100 MG tablet Take 1 tablet by mouth daily as needed for Erectile Dysfunction 23   Paul Vlilar MD   acetaminophen (TYLENOL) 325 MG tablet Take 2 tablets by mouth every 4 hours as needed    Automatic Reconciliation, Ar       Current medications:    No current facility-administered medications for this encounter.     Current Outpatient Medications   Medication Sig Dispense Refill   • allopurinol (ZYLOPRIM) 100 MG tablet Take 1 tablet by mouth once daily 90 tablet 0   • lisinopril (PRINIVIL;ZESTRIL) 20 MG tablet Take 1 tablet by mouth once daily 90 tablet 1   • SYNJARDY 5-500 MG TABS TAKE 1 TABLET BY MOUTH TWICE DAILY WITH MEALS 180 tablet 1   • mupirocin (BACTROBAN) 2 % ointment Apply topically 3 times daily. 22 g 1   • sildenafil (VIAGRA) 100 MG tablet Take 1 tablet by mouth daily as needed for Erectile Dysfunction 30 tablet 2   • acetaminophen (TYLENOL) 325 MG tablet Take 2 tablets by mouth every 4 hours as needed         Allergies:  No Known Allergies    Problem List:    Patient Active Problem List   Diagnosis Code   • Gout M10.9   • Essential hypertension I10   • Type 2

## 2024-10-01 NOTE — PROGRESS NOTES
Endoscope was pre-cleaned at bedside immediately following procedure by FITO Dobson    Glasses returned to patient.

## 2024-10-01 NOTE — H&P
[facility]  Outpatient History and Physical    Patient: Mauri Church    Physician: Heidy Jiménez MD    Vital Signs: Blood pressure (!) 154/94, pulse 91, resp. rate 12, height 1.829 m (6'), weight (!) 160.8 kg (354 lb 9.6 oz), SpO2 97%.    Allergies:   No Known Allergies    Chief Complaint: CRC screening    History of Present Illness: CRC screening    Indication for Procedure: CRC screening    History:  Past Medical History:   Diagnosis Date    Dermoid cyst of skin of back     Diabetes (HCC)     type 2    Gout     Headache     Hypertension     Thyroid disease       Past Surgical History:   Procedure Laterality Date    COLONOSCOPY      ORTHOPEDIC SURGERY      repair quad muscle, bilateral    ORTHOPEDIC SURGERY Right     pin in rt wagner in college    PA UNLISTED PROCEDURE CARDIAC SURGERY      ECHO-WNL    UPPER GASTROINTESTINAL ENDOSCOPY        Social History     Socioeconomic History    Marital status:      Spouse name: None    Number of children: None    Years of education: None    Highest education level: None   Occupational History    Occupation: sells life insurance   Tobacco Use    Smoking status: Former     Current packs/day: 0.00     Average packs/day: 0.1 packs/day for 38.2 years (1.9 ttl pk-yrs)     Types: Cigarettes, Cigars     Start date: 1986     Quit date: 3/30/2024     Years since quittin.5    Smokeless tobacco: Never    Tobacco comments:     Quit smokin per mo cigar   Vaping Use    Vaping status: Never Used   Substance and Sexual Activity    Alcohol use: Yes     Alcohol/week: 4.0 standard drinks of alcohol     Types: 4 Shots of liquor per week    Drug use: Never     Social Determinants of Health     Financial Resource Strain: Low Risk  (2024)    Overall Financial Resource Strain (CARDIA)     Difficulty of Paying Living Expenses: Not hard at all   Food Insecurity: No Food Insecurity (2024)    Hunger Vital Sign     Worried About Running Out

## 2024-10-01 NOTE — DISCHARGE INSTRUCTIONS
take.  How can you care for yourself at home?  Sit in a few inches of warm water (sitz bath) 3 times a day and after bowel movements. The warm water helps with pain and itching.  Put ice on your anal area several times a day for 10 minutes at a time. Put a thin cloth between the ice and your skin. Follow this by placing a warm, wet towel on the area for another 10 to 20 minutes.  Take pain medicines exactly as directed.  If the doctor gave you a prescription medicine for pain, take it as prescribed.  If you are not taking a prescription pain medicine, ask your doctor if you can take an over-the-counter medicine.  Keep the anal area clean, but be gentle. Use water and a fragrance-free soap, or use baby wipes or medicated pads such as Tucks.  Wear cotton underwear and loose clothing to decrease moisture in the anal area.  Eat more fiber. Include foods such as whole-grain breads and cereals, raw vegetables, raw and dried fruits, and beans.  Drink plenty of fluids. If you have kidney, heart, or liver disease and have to limit fluids, talk with your doctor before you increase the amount of fluids you drink.  Use a stool softener that contains bran or psyllium. You can save money by buying bran or psyllium (available in bulk at most health food stores) and sprinkling it on foods or stirring it into fruit juice. Or you can use a product such as Metamucil or Hydrocil.  Practice healthy bowel habits.  Go to the bathroom as soon as you have the urge.  Avoid straining to pass stools. Relax and give yourself time to let things happen naturally.  Do not hold your breath while passing stools.  Do not read while sitting on the toilet. Get off the toilet as soon as you have finished.  Take your medicines exactly as prescribed. Call your doctor if you think you are having a problem with your medicine.  When should you call for help?   Call 911 anytime you think you may need emergency care. For example, call if:    You pass maroon or

## 2024-10-01 NOTE — ANESTHESIA POSTPROCEDURE EVALUATION
Department of Anesthesiology  Postprocedure Note    Patient: Mauri Church  MRN: [id number]  YOB: 1965  Date of evaluation: 10/1/2024    Procedure Summary       Date: 10/01/24 Room / Location: [facility] 04 / [facility] ENDOSCOPY    Anesthesia Start: 0810 Anesthesia Stop: 0843    Procedure: COLONOSCOPY Diagnosis:       Screening for colon cancer      (Screening for colon cancer [Z12.11])    Surgeons: Heidy Jiménez MD Responsible Provider: Jairo Andres MD    Anesthesia Type: MAC ASA Status: 3            Anesthesia Type: No value filed.    Anabell Phase I: Anabell Score: 10    Anabell Phase II: Anabell Score: 10    Anesthesia Post Evaluation    Patient location during evaluation: PACU  Patient participation: complete - patient participated  Level of consciousness: awake and alert  Airway patency: patent  Nausea & Vomiting: no nausea and no vomiting  Cardiovascular status: hemodynamically stable  Respiratory status: acceptable  Hydration status: stable    No notable events documented.

## 2024-10-01 NOTE — PROGRESS NOTES
ARRIVAL INFORMATION:  Verified patient name and date of birth, scheduled procedure, and informed consent.     : Patricia contact number: [phone]  Physician and staff can share information with the .     Receive texts: yes    Belongings with patient include:  Clothing,Glasses    GI FOCUSED ASSESSMENT:  Neuro: Awake, alert, oriented x4  Respiratory: even and unlabored   GI: soft and non-distended  EKG Rhythm: normal sinus rhythm    Education:Reviewed general discharge instructions and  information.

## 2024-10-01 NOTE — OP NOTE
Retroflexion in the rectum revealed small internal hemorrhoids    Specimens:    none  See above    Complications:   None; patient tolerated the procedure well.    Impression:  - Suboptimal bowel prep (likely due to not having split prep instructions)  - Mild pan diverticulosis    Recommendations:   - Resume diet as tolerated  - Resume all home medicines  - Recall colonoscopy in 2 years due to suboptimal prep (will need miralax for 1 week pre procedure and SPLIT dose 1 gallon prep)    Thank you for entrusting me with this patient's care.  Please do not hesitate to contact me with any questions or if I can be of assistance with any of your other patients' GI needs.    Signed By: Heidy Jiménez MD                        October 1, 2024      Surgical assistant none.  Implants none unless specified.

## 2024-10-15 ENCOUNTER — OFFICE VISIT (OUTPATIENT)
Facility: CLINIC | Age: 59
End: 2024-10-15
Payer: COMMERCIAL

## 2024-10-15 ENCOUNTER — TELEPHONE (OUTPATIENT)
Facility: CLINIC | Age: 59
End: 2024-10-15

## 2024-10-15 VITALS
SYSTOLIC BLOOD PRESSURE: 128 MMHG | TEMPERATURE: 98.5 F | RESPIRATION RATE: 16 BRPM | DIASTOLIC BLOOD PRESSURE: 85 MMHG | WEIGHT: 315 LBS | HEIGHT: 72 IN | OXYGEN SATURATION: 95 % | HEART RATE: 90 BPM | BODY MASS INDEX: 42.66 KG/M2

## 2024-10-15 DIAGNOSIS — I10 ESSENTIAL HYPERTENSION: ICD-10-CM

## 2024-10-15 DIAGNOSIS — Z23 NEEDS FLU SHOT: ICD-10-CM

## 2024-10-15 DIAGNOSIS — E11.65 TYPE 2 DIABETES MELLITUS WITH HYPERGLYCEMIA, WITHOUT LONG-TERM CURRENT USE OF INSULIN (HCC): Primary | ICD-10-CM

## 2024-10-15 DIAGNOSIS — L70.0 BLACKHEAD: ICD-10-CM

## 2024-10-15 PROCEDURE — 3079F DIAST BP 80-89 MM HG: CPT | Performed by: FAMILY MEDICINE

## 2024-10-15 PROCEDURE — 90471 IMMUNIZATION ADMIN: CPT | Performed by: FAMILY MEDICINE

## 2024-10-15 PROCEDURE — 3074F SYST BP LT 130 MM HG: CPT | Performed by: FAMILY MEDICINE

## 2024-10-15 PROCEDURE — 3052F HG A1C>EQUAL 8.0%<EQUAL 9.0%: CPT | Performed by: FAMILY MEDICINE

## 2024-10-15 PROCEDURE — 90661 CCIIV3 VAC ABX FR 0.5 ML IM: CPT | Performed by: FAMILY MEDICINE

## 2024-10-15 PROCEDURE — 99214 OFFICE O/P EST MOD 30 MIN: CPT | Performed by: FAMILY MEDICINE

## 2024-10-15 ASSESSMENT — PATIENT HEALTH QUESTIONNAIRE - PHQ9
2. FEELING DOWN, DEPRESSED OR HOPELESS: SEVERAL DAYS
SUM OF ALL RESPONSES TO PHQ QUESTIONS 1-9: 2
SUM OF ALL RESPONSES TO PHQ9 QUESTIONS 1 & 2: 2
SUM OF ALL RESPONSES TO PHQ QUESTIONS 1-9: 2
SUM OF ALL RESPONSES TO PHQ QUESTIONS 1-9: 2
1. LITTLE INTEREST OR PLEASURE IN DOING THINGS: SEVERAL DAYS
SUM OF ALL RESPONSES TO PHQ QUESTIONS 1-9: 2

## 2024-10-15 NOTE — TELEPHONE ENCOUNTER
Patient states he spoke with Dr Villar this morning about a script for indigestion, but it appears it was not sent to the pharmacy with other scripts he ordered. Please check with Dr Villar about getting medication ordered.

## 2024-10-15 NOTE — PROGRESS NOTES
Chief Complaint   Patient presents with    Diabetes    Mass     Mid chest bump 1 year     Patient has not been out of the country in (14 months), NO diarrhea, NO cough, NO chest conjestion, NO temp.  Pt has not been around anyone with these symptoms.     Health Maintenance reviewed.    I have reviewed the patient's medical history in detail and updated the computerized patient record.    \"Have you been to the ER, urgent care clinic since your last visit? no  Hospitalized since your last visit?\"    no    “Have you seen or consulted any other health care providers outside of [facility] since your last visit?”    no    [unfilled]  OFFICE PROCEDURE PROGRESS NOTE        Chart reviewed for the following:   Cindy NIXON LPN, have reviewed the History, Physical and updated the Allergic reactions for [place]     TIME OUT performed immediately prior to start of procedure:   Paul NIOXN MD have performed the following reviews on [place] prior to the start of the procedure:            * Patient was identified by name and date of birth   * Agreement on procedure being performed was verified  * Risks and Benefits explained to the patient by Paul Villar MD  * Procedure site verified and marked as necessary  * Patient was positioned for comfort  * Consent was signed and verified     Time: 9:30am    Date of procedure:    Procedure performed by:  Paul Villar MD    Provider assisted by: Cindy Lopez LPN    Patient assisted by:  Cindy Lopez LPN    How tolerated by patient: Well    Post Procedural Pain Scale: 0/10    Comments: None    Dr. Villar did the procedure                                     '  
GLUCOSE 123 05/31/2024 02:10 PM    CALCIUM 9.6 05/31/2024 02:10 PM    LABGLOM 107 05/31/2024 02:10 PM    LABGLOM 96 12/15/2023 01:19 PM        He has the following problems:  Patient Active Problem List   Diagnosis    Gout    Essential hypertension    Type 2 diabetes mellitus with hyperglycemia, without long-term current use of insulin ([facility])    Body mass index (BMI) 45.0-49.9, adult      History of Present Illness  The patient is a 58-year-old male who presents for evaluation of multiple medical concerns.    He has been managing his diabetes with blood sugar levels consistently between 110 and 160. This morning, his reading was 156 post-breakfast. He is currently on Synjardy twice daily, which he reports as effective. He reports no history of gout or yeast infections but experiences frequent urination. He also reports occasional diarrhea, which he attributes to his diet. He has lost approximately 20 pounds since starting this medication.  Expresses some discomfort with taking metformin and is worried that it is not good for him, reassured him that for most people they have no issues with it.  Can reduce the dosage if he wishes since we are  the metformin from the Jardiance    He continues to experience stomach discomfort, despite a normal colonoscopy. The pain is described as intermittent and suspected to be indigestion. He has tried antacids and dietary modifications, including high and low fiber diets and reducing red meat intake, but these changes have not alleviated his symptoms. He has also tried Prilosec for 14 days without success. In February 2024, he visited the emergency room due to severe stomach pain lasting three days, which was diagnosed as gas after a dye test.    He has a cyst on his chest that he would like removed. He recalls having a similar cyst a few years ago that bled profusely when popped. The current cyst is described as firm and unsqueezable.    He has seen his eye doctor but did

## 2024-10-16 ENCOUNTER — TELEPHONE (OUTPATIENT)
Facility: CLINIC | Age: 59
End: 2024-10-16

## 2024-10-16 DIAGNOSIS — K21.9 GASTROESOPHAGEAL REFLUX DISEASE, UNSPECIFIED WHETHER ESOPHAGITIS PRESENT: Primary | ICD-10-CM

## 2024-10-16 RX ORDER — FAMOTIDINE 40 MG/1
40 TABLET, FILM COATED ORAL EVERY EVENING
Qty: 30 TABLET | Refills: 3 | Status: SHIPPED | OUTPATIENT
Start: 2024-10-16

## 2024-10-16 NOTE — TELEPHONE ENCOUNTER
Pt didn't receive a prescription called in for his indigestion yesterday after his visit. He would like something called in to [facility]

## 2024-10-17 LAB
ALBUMIN SERPL-MCNC: 4.2 G/DL (ref 3.8–4.9)
ALP SERPL-CCNC: 85 IU/L (ref 44–121)
ALT SERPL-CCNC: 17 IU/L (ref 0–44)
AST SERPL-CCNC: 15 IU/L (ref 0–40)
BILIRUB SERPL-MCNC: <0.2 MG/DL (ref 0–1.2)
BUN SERPL-MCNC: 15 MG/DL (ref 6–24)
BUN/CREAT SERPL: 20 (ref 9–20)
CALCIUM SERPL-MCNC: 9.4 MG/DL (ref 8.7–10.2)
CHLORIDE SERPL-SCNC: 104 MMOL/L (ref 96–106)
CHOLEST SERPL-MCNC: 186 MG/DL (ref 100–199)
CO2 SERPL-SCNC: 25 MMOL/L (ref 20–29)
CREAT SERPL-MCNC: 0.76 MG/DL (ref 0.76–1.27)
EGFRCR SERPLBLD CKD-EPI 2021: 105 ML/MIN/1.73
GLOBULIN SER CALC-MCNC: 3 G/DL (ref 1.5–4.5)
GLUCOSE SERPL-MCNC: 113 MG/DL (ref 70–99)
HBA1C MFR BLD: 7.7 % (ref 4.8–5.6)
HDLC SERPL-MCNC: 63 MG/DL
IMP & REVIEW OF LAB RESULTS: NORMAL
LDLC SERPL CALC-MCNC: 84 MG/DL (ref 0–99)
Lab: NORMAL
POTASSIUM SERPL-SCNC: 5 MMOL/L (ref 3.5–5.2)
PROT SERPL-MCNC: 7.2 G/DL (ref 6–8.5)
SODIUM SERPL-SCNC: 144 MMOL/L (ref 134–144)
TRIGL SERPL-MCNC: 240 MG/DL (ref 0–149)
VLDLC SERPL CALC-MCNC: 39 MG/DL (ref 5–40)

## 2024-10-22 NOTE — PROGRESS NOTES
Mauri Church (:  1965) is a 58 y.o. male, Established patient, here for evaluation of the following chief complaint(s): cyst removal  Mass (Bump mid chest )         Assessment & Plan  1. Diabetes Mellitus.  His A1c is well controlled, indicating good management of his diabetes. No hypoglycemia was reported.  He should continue his current diabetes management plan.    2. Cyst.  The cyst, located in the substernal chest area, was marked and cleaned with alcohol and Betadine. Approximately 8 cc of lidocaine was administered around the lesion. After a 5-minute wait for the medication to take effect, sensation was tested and found to be adequately numb. The cyst, measuring 1.5 cm in diameter, was then dissected out. It will be preserved in formalin for further analysis. Five sutures were placed and care instructions were provided, including cleaning the area with soap and water. He was advised to avoid heavy lifting for a couple of weeks and to return in 7 days for suture removal.    Follow-up  Return in 1 week for suture removal.    Results  Laboratory Studies  A1c indicates good control of diabetes.  1. [place]  -     Surgical Pathology; Future  -     EXCISION TUMOR SOFT TISSUE BACK/FLANK SUBQ <3CM       Options discussed. Discussed possible side affects and benefits of the procedure and/or medications. The patient agrees to undergo the procedure. Consent obtained. Sterile procedure followed. There were no complications and the procedure was well tolerated. Instructed patient to call me if it is ineffective or if there are any complications like increasing pain, redness or swelling.         Diagnosis Orders   1. [place]  Surgical Pathology    EXCISION TUMOR SOFT TISSUE BACK/FLANK SUBQ <3CM        Orders Placed This Encounter    EXCISION TUMOR SOFT TISSUE BACK/FLANK SUBQ <3CM    Surgical Pathology     Standing Status:   Future     Standing Expiration Date:   10/23/2025     Order Specific Question:

## 2024-10-23 ENCOUNTER — OFFICE VISIT (OUTPATIENT)
Facility: CLINIC | Age: 59
End: 2024-10-23

## 2024-10-23 ENCOUNTER — HOSPITAL ENCOUNTER (OUTPATIENT)
Facility: HOSPITAL | Age: 59
Setting detail: SPECIMEN
Discharge: HOME | End: 2024-10-26

## 2024-10-23 VITALS
WEIGHT: 315 LBS | OXYGEN SATURATION: 93 % | DIASTOLIC BLOOD PRESSURE: 85 MMHG | HEIGHT: 72 IN | TEMPERATURE: 98 F | HEART RATE: 94 BPM | SYSTOLIC BLOOD PRESSURE: 128 MMHG | BODY MASS INDEX: 42.66 KG/M2 | RESPIRATION RATE: 16 BRPM

## 2024-10-23 DIAGNOSIS — L70.0 BLACKHEAD: ICD-10-CM

## 2024-10-23 DIAGNOSIS — L70.0 BLACKHEAD: Primary | ICD-10-CM

## 2024-10-23 NOTE — PROGRESS NOTES
Chief Complaint   Patient presents with    Mass     Bump mid chest      Patient has not been out of the country in (14 months), NO diarrhea, NO cough, NO chest conjestion, NO temp.  Pt has not been around anyone with these symptoms.     Health Maintenance reviewed.    I have reviewed the patient's medical history in detail and updated the computerized patient record.    \"Have you been to the ER, urgent care clinic since your last visit? No  Hospitalized since your last visit?\"    no    “Have you seen or consulted any other health care providers outside of [facility] since your last visit?”    no     [unfilled]  OFFICE PROCEDURE PROGRESS NOTE        Chart reviewed for the following:   Cindy NIXON LPN, have reviewed the History, Physical and updated the Allergic reactions for [place]     TIME OUT performed immediately prior to start of procedure:   Paul NIXON MD have performed the following reviews on [place] prior to the start of the procedure:            * Patient was identified by name and date of birth   * Agreement on procedure being performed was verified  * Risks and Benefits explained to the patient by Paul Villar MD  * Procedure site verified and marked as necessary  * Patient was positioned for comfort  * Consent was signed and verified     Time:   1130 AM    Date of procedure:  10-      Procedure performed by:  Paul Villar MD    Provider assisted by: Cindy Lopez LPN    Patient assisted by:  Cindy Lopez LPN    How tolerated by patient: Well    Post Procedural Pain Scale: 0/10    Comments: None    Dr. Villar did the procedure

## 2024-11-04 NOTE — PROGRESS NOTES
Assessment/Plan:        Assessment & Plan      Results    1. Type 2 diabetes mellitus with hyperglycemia, without long-term current use of insulin (HCC)    Sutures removed       No orders of the defined types were placed in this encounter.  Suture removal no complaints    Current Outpatient Medications   Medication Sig Dispense Refill    famotidine (PEPCID) 40 MG tablet Take 1 tablet by mouth every evening 30 tablet 3    metFORMIN (GLUCOPHAGE) 500 MG tablet Take 1 tablet by mouth 2 times daily (with meals) 180 tablet 1    empagliflozin (JARDIANCE) 25 MG tablet Take 1 tablet by mouth daily 90 tablet 2    allopurinol (ZYLOPRIM) 100 MG tablet Take 1 tablet by mouth once daily 90 tablet 0    lisinopril (PRINIVIL;ZESTRIL) 20 MG tablet Take 1 tablet by mouth once daily 90 tablet 1    mupirocin (BACTROBAN) 2 % ointment Apply topically 3 times daily. 22 g 1    sildenafil (VIAGRA) 100 MG tablet Take 1 tablet by mouth daily as needed for Erectile Dysfunction 30 tablet 2    acetaminophen (TYLENOL) 325 MG tablet Take 2 tablets by mouth every 4 hours as needed       No current facility-administered medications for this visit.       Return in about 5 months (around 4/5/2025).      Subjective:     Mauri Church is a 58 y.o. male seen for follow-up of diabetes.     He has had hypoglycemic attacks. .no  Blood sugar control has been okay    Hemoglobin A1C   Date Value Ref Range Status   10/16/2024 7.7 (H) 4.8 - 5.6 % Final     Comment:                 Prediabetes: 5.7 - 6.4           Diabetes: >6.4           Glycemic control for adults with diabetes: <7.0       Hemoglobin A1C, POC   Date Value Ref Range Status   05/28/2021 7.5 % Final     Lab Results   Component Value Date/Time     10/16/2024 01:45 PM    K 5.0 10/16/2024 01:45 PM     10/16/2024 01:45 PM    CO2 25 10/16/2024 01:45 PM    BUN 15 10/16/2024 01:45 PM    CREATININE 0.76 10/16/2024 01:45 PM    GLUCOSE 113 10/16/2024 01:45 PM    CALCIUM 9.4 10/16/2024

## 2024-11-05 ENCOUNTER — OFFICE VISIT (OUTPATIENT)
Facility: CLINIC | Age: 59
End: 2024-11-05

## 2024-11-05 VITALS
DIASTOLIC BLOOD PRESSURE: 84 MMHG | HEART RATE: 95 BPM | BODY MASS INDEX: 42.66 KG/M2 | OXYGEN SATURATION: 96 % | SYSTOLIC BLOOD PRESSURE: 135 MMHG | HEIGHT: 72 IN | WEIGHT: 315 LBS

## 2024-11-05 DIAGNOSIS — E11.65 TYPE 2 DIABETES MELLITUS WITH HYPERGLYCEMIA, WITHOUT LONG-TERM CURRENT USE OF INSULIN (HCC): Primary | ICD-10-CM

## 2024-11-05 ASSESSMENT — PATIENT HEALTH QUESTIONNAIRE - PHQ9
2. FEELING DOWN, DEPRESSED OR HOPELESS: NOT AT ALL
SUM OF ALL RESPONSES TO PHQ9 QUESTIONS 1 & 2: 0
1. LITTLE INTEREST OR PLEASURE IN DOING THINGS: NOT AT ALL
SUM OF ALL RESPONSES TO PHQ QUESTIONS 1-9: 0

## 2024-11-11 RX ORDER — ALLOPURINOL 100 MG/1
100 TABLET ORAL DAILY
Qty: 90 TABLET | Refills: 0 | Status: SHIPPED | OUTPATIENT
Start: 2024-11-11

## 2024-12-30 RX ORDER — LISINOPRIL 20 MG/1
20 TABLET ORAL DAILY
Qty: 100 TABLET | Refills: 2 | Status: SHIPPED | OUTPATIENT
Start: 2024-12-30

## 2025-01-21 ENCOUNTER — TELEPHONE (OUTPATIENT)
Facility: CLINIC | Age: 60
End: 2025-01-21

## 2025-01-21 NOTE — TELEPHONE ENCOUNTER
Pt calling for referral to [facility] for back pain/sciatica. Please call when this is done so he can come pick this up.

## 2025-01-22 DIAGNOSIS — M54.32 SCIATICA OF LEFT SIDE: Primary | ICD-10-CM

## 2025-01-29 NOTE — PROGRESS NOTES
Assessment/Plan:        Assessment & Plan  1. Left-sided sciatica.  The patient's symptoms suggest a possible pinched nerve. A prescription for Flexeril 10 mg has been provided, with instructions to take 1 to 2 tablets twice daily, not exceeding 4 tablets per day. He is advised to start with one tablet at bedtime or with dinner and adjust the dosage as needed. A referral for physical therapy has been made. A urine test will be conducted to rule out the presence of kidney stones. He is advised to continue with physical therapy and muscle relaxants. If there is no improvement by March 2025, he should keep his appointment with the orthopedic specialist.    2. Diabetes mellitus.  A urine test will be conducted to monitor kidney function.    3. Gastroesophageal reflux disease (GERD).  A 90-day refill of famotidine has been provided.    4. Gout.  He reports no further episodes of gout since starting allopurinol, indicating the medication is effective.    Follow-up  The patient will follow up in March 2025.    Results    1. Type 2 diabetes mellitus with hyperglycemia, without long-term current use of insulin (Former[facility])  -     Albumin/Creatinine Ratio, Urine  -     empagliflozin (JARDIANCE) 25 MG tablet; Take 1 tablet by mouth daily, Disp-90 tablet, R-1Normal  -     metFORMIN (GLUCOPHAGE) 500 MG tablet; Take 1 tablet by mouth 2 times daily (with meals), Disp-200 tablet, R-1Normal  2. Body mass index (BMI) 45.0-49.9, adult  3. Essential hypertension  4. Sciatica of left side  -     Amb External Referral To Physical Therapy  -     cyclobenzaprine (FLEXERIL) 5 MG tablet; 1 to 2 twice daily, Disp-60 tablet, R-1Normal  -     AMB POC URINALYSIS DIP STICK AUTO W/O MICRO  5. Gastroesophageal reflux disease, unspecified whether esophagitis present  -     famotidine (PEPCID) 40 MG tablet; Take 1 tablet by mouth every evening, Disp-100 tablet, R-1Normal  6. Gout involving toe, unspecified cause, unspecified chronicity, unspecified

## 2025-01-30 ENCOUNTER — OFFICE VISIT (OUTPATIENT)
Facility: CLINIC | Age: 60
End: 2025-01-30
Payer: COMMERCIAL

## 2025-01-30 VITALS
BODY MASS INDEX: 42.66 KG/M2 | HEIGHT: 72 IN | HEART RATE: 86 BPM | TEMPERATURE: 97.8 F | RESPIRATION RATE: 24 BRPM | DIASTOLIC BLOOD PRESSURE: 80 MMHG | SYSTOLIC BLOOD PRESSURE: 136 MMHG | OXYGEN SATURATION: 95 % | WEIGHT: 315 LBS

## 2025-01-30 DIAGNOSIS — M10.9 GOUT INVOLVING TOE, UNSPECIFIED CAUSE, UNSPECIFIED CHRONICITY, UNSPECIFIED LATERALITY: ICD-10-CM

## 2025-01-30 DIAGNOSIS — K21.9 GASTROESOPHAGEAL REFLUX DISEASE, UNSPECIFIED WHETHER ESOPHAGITIS PRESENT: ICD-10-CM

## 2025-01-30 DIAGNOSIS — M54.32 SCIATICA OF LEFT SIDE: ICD-10-CM

## 2025-01-30 DIAGNOSIS — E11.65 TYPE 2 DIABETES MELLITUS WITH HYPERGLYCEMIA, WITHOUT LONG-TERM CURRENT USE OF INSULIN (HCC): Primary | ICD-10-CM

## 2025-01-30 DIAGNOSIS — I10 ESSENTIAL HYPERTENSION: ICD-10-CM

## 2025-01-30 PROCEDURE — 3079F DIAST BP 80-89 MM HG: CPT | Performed by: FAMILY MEDICINE

## 2025-01-30 PROCEDURE — 3075F SYST BP GE 130 - 139MM HG: CPT | Performed by: FAMILY MEDICINE

## 2025-01-30 PROCEDURE — 99214 OFFICE O/P EST MOD 30 MIN: CPT | Performed by: FAMILY MEDICINE

## 2025-01-30 RX ORDER — CYCLOBENZAPRINE HCL 5 MG
TABLET ORAL
Qty: 60 TABLET | Refills: 1 | Status: SHIPPED | OUTPATIENT
Start: 2025-01-30

## 2025-01-30 RX ORDER — ALLOPURINOL 100 MG/1
100 TABLET ORAL DAILY
Qty: 100 TABLET | Refills: 1 | Status: SHIPPED | OUTPATIENT
Start: 2025-01-30

## 2025-01-30 RX ORDER — FAMOTIDINE 40 MG/1
40 TABLET, FILM COATED ORAL EVERY EVENING
Qty: 100 TABLET | Refills: 1 | Status: SHIPPED | OUTPATIENT
Start: 2025-01-30

## 2025-01-30 NOTE — PROGRESS NOTES
Chief Complaint   Patient presents with    Lower Back Pain     Orthopedics can't see him until March 20, 2025 - pain getting worse - pinky toe numb left foot

## 2025-02-03 ENCOUNTER — TELEPHONE (OUTPATIENT)
Facility: CLINIC | Age: 60
End: 2025-02-03

## 2025-02-03 NOTE — TELEPHONE ENCOUNTER
Patient stated that he needs an antibiotic, stated that he is very congested has a fever and a uncontrollable cough

## 2025-02-04 ENCOUNTER — TELEMEDICINE (OUTPATIENT)
Facility: CLINIC | Age: 60
End: 2025-02-04
Payer: COMMERCIAL

## 2025-02-04 DIAGNOSIS — R05.1 ACUTE COUGH: Primary | ICD-10-CM

## 2025-02-04 PROCEDURE — 99213 OFFICE O/P EST LOW 20 MIN: CPT | Performed by: FAMILY MEDICINE

## 2025-02-04 RX ORDER — AZITHROMYCIN 250 MG/1
TABLET, FILM COATED ORAL
Qty: 6 TABLET | Refills: 0 | Status: SHIPPED | OUTPATIENT
Start: 2025-02-04 | End: 2025-02-14

## 2025-02-04 NOTE — PROGRESS NOTES
Mauri Church, was evaluated through a synchronous (real-time) audio-video encounter. The patient (or guardian if applicable) is aware that this is a billable service, which includes applicable co-pays. This Virtual Visit was conducted with patient's (and/or legal guardian's) consent. Patient identification was verified, and a caregiver was present when appropriate.   The patient was located at Home: [address] [place] [id number]  Provider was located at Home [place]Appt Dept State[place]  Confirm you are appropriately licensed, registered, or certified to deliver care in the state where the patient is located as indicated above. If you are not or unsure, please re-schedule the visit: Yes, I confirm.     Mauri Church (:  1965) is a Established patient, presenting virtually for evaluation of the following: cough    Assessment & Plan   Below is the assessment and plan developed based on review of pertinent history, physical exam, labs, studies, and medications.  Assessment & Plan      Results    1. Acute cough  -     azithromycin (ZITHROMAX) 250 MG tablet; 500mg on day 1 followed by 250mg on days 2 - 5, Disp-6 tablet, R-0Normal       ICD-10-CM    1. Acute cough  R05.1 azithromycin (ZITHROMAX) 250 MG tablet          Current Outpatient Medications   Medication Sig Dispense Refill    azithromycin (ZITHROMAX) 250 MG tablet 500mg on day 1 followed by 250mg on days 2 - 5 6 tablet 0    cyclobenzaprine (FLEXERIL) 5 MG tablet 1 to 2 twice daily 60 tablet 1    allopurinol (ZYLOPRIM) 100 MG tablet Take 1 tablet by mouth daily 100 tablet 1    empagliflozin (JARDIANCE) 25 MG tablet Take 1 tablet by mouth daily 90 tablet 1    metFORMIN (GLUCOPHAGE) 500 MG tablet Take 1 tablet by mouth 2 times daily (with meals) 200 tablet 1    famotidine (PEPCID) 40 MG tablet Take 1 tablet by mouth every evening 100 tablet 1    lisinopril (PRINIVIL;ZESTRIL) 20 MG tablet Take 1 tablet by mouth once daily 100 tablet 2    mupirocin

## 2025-02-04 NOTE — TELEPHONE ENCOUNTER
Patient states he was just seen on 1/30/2025 and Friday started with symptoms of cough, fever, and head congestion - fever has been just over 100 - scheduled a virtual visit for today at 115pm with Dr Aurea Mensah LPN 2/4/2025 11:44 AM

## 2025-04-16 LAB
APPEARANCE UR: CLEAR
BILIRUB UR QL STRIP: NEGATIVE
COLOR UR: YELLOW
GLUCOSE UR QL STRIP: ABNORMAL
HGB UR QL STRIP: NEGATIVE
KETONES UR QL STRIP: NEGATIVE
LEUKOCYTE ESTERASE UR QL STRIP: NEGATIVE
MICRO URNS: ABNORMAL
NITRITE UR QL STRIP: NEGATIVE
PH UR STRIP: 6 [PH] (ref 5–7.5)
PROT UR QL STRIP: ABNORMAL
SP GR UR STRIP: >=1.03 (ref 1–1.03)
UROBILINOGEN UR STRIP-MCNC: 0.2 MG/DL (ref 0.2–1)

## 2025-04-16 NOTE — PROGRESS NOTES
Assessment/Plan:        Assessment & Plan  1. Left knee pain.  - Chronic left knee pain due to a work-related injury 19 years ago, now exacerbated by arthritis.  - Received a cortisone shot in mid-February, which wore off after a month; another cortisone shot is scheduled for mid-May.  - Advised that significant weight loss is necessary before considering knee replacement surgery; given a temporary 6-month disabled parking pass and requested a permanent one.  - Will discuss further options with the orthopedic doctor in May.    2. Weight management.  - Needs to lose approximately 100 pounds to qualify for knee replacement surgery.  - Advised to contact the insurance company to determine coverage for Mounjaro or Ozempic; prescription for Mounjaro will be provided, starting at a low dose and gradually increasing.  - Potential side effects, including pancreatitis, dyspepsia, nausea, diarrhea, and constipation, were discussed.  - Blood work, including A1c, will be ordered to monitor diabetes and support the insurance claim for weight loss medication.    3. Diabetes mellitus.  - Diabetes is currently well-controlled with Jardiance 25 mg and metformin.  - Urine analysis showed no signs of infection but did show glucose, which is expected due to medication; blood work will be ordered to monitor A1c levels.  - Referral to an ophthalmologist for a diabetic eye exam will be provided.  - Will continue current medications, and all necessary refills will be provided for 90 days.    Follow-up  - Follow up in 3 months.    Results  Labs   - Urine analysis: No infection, just sugar  1. Type 2 diabetes mellitus with hyperglycemia, without long-term current use of insulin (HCC)  -     Hemoglobin A1C; Future  -     Albumin/Creatinine Ratio, Urine  -     Amb External Referral To Ophthalmology  -     empagliflozin (JARDIANCE) 25 MG tablet; Take 1 tablet by mouth daily, Disp-100 tablet, R-2Normal  -     metFORMIN (GLUCOPHAGE) 500 MG

## 2025-04-17 ENCOUNTER — OFFICE VISIT (OUTPATIENT)
Facility: CLINIC | Age: 60
End: 2025-04-17
Payer: COMMERCIAL

## 2025-04-17 VITALS
WEIGHT: 315 LBS | OXYGEN SATURATION: 97 % | DIASTOLIC BLOOD PRESSURE: 85 MMHG | TEMPERATURE: 97.3 F | SYSTOLIC BLOOD PRESSURE: 136 MMHG | HEIGHT: 72 IN | HEART RATE: 98 BPM | RESPIRATION RATE: 14 BRPM | BODY MASS INDEX: 42.66 KG/M2

## 2025-04-17 DIAGNOSIS — E11.65 TYPE 2 DIABETES MELLITUS WITH HYPERGLYCEMIA, WITHOUT LONG-TERM CURRENT USE OF INSULIN (HCC): Primary | ICD-10-CM

## 2025-04-17 DIAGNOSIS — M10.9 GOUT INVOLVING TOE, UNSPECIFIED CAUSE, UNSPECIFIED CHRONICITY, UNSPECIFIED LATERALITY: ICD-10-CM

## 2025-04-17 DIAGNOSIS — K21.9 GASTROESOPHAGEAL REFLUX DISEASE, UNSPECIFIED WHETHER ESOPHAGITIS PRESENT: ICD-10-CM

## 2025-04-17 DIAGNOSIS — M17.12 ARTHRITIS OF KNEE, LEFT: ICD-10-CM

## 2025-04-17 DIAGNOSIS — I10 ESSENTIAL HYPERTENSION: ICD-10-CM

## 2025-04-17 LAB
ALBUMIN/CREAT UR: 15 MG/G CREAT (ref 0–29)
CREAT UR-MCNC: 139.7 MG/DL
MICROALBUMIN UR-MCNC: 20.4 UG/ML

## 2025-04-17 PROCEDURE — 3075F SYST BP GE 130 - 139MM HG: CPT | Performed by: FAMILY MEDICINE

## 2025-04-17 PROCEDURE — 3079F DIAST BP 80-89 MM HG: CPT | Performed by: FAMILY MEDICINE

## 2025-04-17 PROCEDURE — 99214 OFFICE O/P EST MOD 30 MIN: CPT | Performed by: FAMILY MEDICINE

## 2025-04-17 RX ORDER — ALLOPURINOL 100 MG/1
100 TABLET ORAL DAILY
Qty: 100 TABLET | Refills: 2 | Status: SHIPPED | OUTPATIENT
Start: 2025-04-17

## 2025-04-17 RX ORDER — FAMOTIDINE 40 MG/1
40 TABLET, FILM COATED ORAL EVERY EVENING
Qty: 100 TABLET | Refills: 2 | Status: SHIPPED | OUTPATIENT
Start: 2025-04-17

## 2025-04-17 RX ORDER — LISINOPRIL 20 MG/1
20 TABLET ORAL DAILY
Qty: 100 TABLET | Refills: 2 | Status: SHIPPED | OUTPATIENT
Start: 2025-04-17

## 2025-04-17 ASSESSMENT — PATIENT HEALTH QUESTIONNAIRE - PHQ9
2. FEELING DOWN, DEPRESSED OR HOPELESS: NOT AT ALL
SUM OF ALL RESPONSES TO PHQ QUESTIONS 1-9: 0
1. LITTLE INTEREST OR PLEASURE IN DOING THINGS: NOT AT ALL
SUM OF ALL RESPONSES TO PHQ QUESTIONS 1-9: 0

## 2025-04-17 NOTE — PROGRESS NOTES
\"Have you been to the ER, urgent care clinic since your last visit?  Hospitalized since your last visit?\"    NO    “Have you seen or consulted any other health care providers outside our system since your last visit?”    NO      “Have you had a diabetic eye exam?”    NO     No diabetic eye exam on file              Chief Complaint   Patient presents with    Knee Injury     L knee pain / injury . Possible replacement

## 2025-04-17 NOTE — PATIENT INSTRUCTIONS
Newer diabetes medication have good cardiovascular profiles and reduce heart attacks and strokes.  They however can be very expensive and have their own set of side effects.    SGLT2 Inhibitors: All oral, small amounts of weight loss.  Jardiance, Farxiga, Invokana, Qtern, Cleveglatro    GLP agonist: Injectable, have weight loss properties much greater weight loss.    Victoza, Ozempic, Byetta, Tanzeum, Mounjaro    GLP-1 agonists: Oral  Rybelsus

## 2025-04-18 LAB
CREAT UR-MCNC: 46.3 MG/DL
MICROALBUMIN UR-MCNC: 0.81 MG/DL
MICROALBUMIN/CREAT UR-RTO: 17 MG/G (ref 0–30)

## 2025-04-19 ENCOUNTER — RESULTS FOLLOW-UP (OUTPATIENT)
Facility: CLINIC | Age: 60
End: 2025-04-19

## 2025-06-23 ENCOUNTER — TELEPHONE (OUTPATIENT)
Facility: CLINIC | Age: 60
End: 2025-06-23

## 2025-07-01 ENCOUNTER — TELEPHONE (OUTPATIENT)
Facility: CLINIC | Age: 60
End: 2025-07-01

## 2025-07-14 DIAGNOSIS — E78.2 MIXED HYPERLIPIDEMIA: Primary | ICD-10-CM

## 2025-07-14 DIAGNOSIS — Z12.5 SCREENING FOR PROSTATE CANCER: ICD-10-CM

## 2025-08-28 ENCOUNTER — OFFICE VISIT (OUTPATIENT)
Facility: CLINIC | Age: 60
End: 2025-08-28
Payer: COMMERCIAL

## 2025-08-28 VITALS
HEART RATE: 66 BPM | RESPIRATION RATE: 16 BRPM | BODY MASS INDEX: 42.66 KG/M2 | TEMPERATURE: 98.2 F | WEIGHT: 315 LBS | OXYGEN SATURATION: 97 % | HEIGHT: 72 IN | SYSTOLIC BLOOD PRESSURE: 134 MMHG | DIASTOLIC BLOOD PRESSURE: 88 MMHG

## 2025-08-28 DIAGNOSIS — E11.65 TYPE 2 DIABETES MELLITUS WITH HYPERGLYCEMIA, WITHOUT LONG-TERM CURRENT USE OF INSULIN (HCC): Primary | ICD-10-CM

## 2025-08-28 DIAGNOSIS — I10 ESSENTIAL HYPERTENSION: ICD-10-CM

## 2025-08-28 DIAGNOSIS — E78.2 MIXED HYPERLIPIDEMIA: ICD-10-CM

## 2025-08-28 DIAGNOSIS — N52.9 ERECTILE DYSFUNCTION, UNSPECIFIED ERECTILE DYSFUNCTION TYPE: ICD-10-CM

## 2025-08-28 PROCEDURE — 99214 OFFICE O/P EST MOD 30 MIN: CPT | Performed by: FAMILY MEDICINE

## 2025-08-28 PROCEDURE — 3075F SYST BP GE 130 - 139MM HG: CPT | Performed by: FAMILY MEDICINE

## 2025-08-28 PROCEDURE — 3079F DIAST BP 80-89 MM HG: CPT | Performed by: FAMILY MEDICINE

## 2025-08-28 RX ORDER — VARDENAFIL HYDROCHLORIDE 20 MG/1
20 TABLET ORAL PRN
Qty: 5 TABLET | Refills: 3 | Status: SHIPPED | OUTPATIENT
Start: 2025-08-28

## 2025-08-28 RX ORDER — ATORVASTATIN CALCIUM 20 MG/1
20 TABLET, FILM COATED ORAL DAILY
Qty: 30 TABLET | Refills: 5 | Status: SHIPPED | OUTPATIENT
Start: 2025-08-28

## 2025-08-28 ASSESSMENT — PATIENT HEALTH QUESTIONNAIRE - PHQ9
SUM OF ALL RESPONSES TO PHQ QUESTIONS 1-9: 0
SUM OF ALL RESPONSES TO PHQ QUESTIONS 1-9: 0
2. FEELING DOWN, DEPRESSED OR HOPELESS: NOT AT ALL
SUM OF ALL RESPONSES TO PHQ QUESTIONS 1-9: 0
1. LITTLE INTEREST OR PLEASURE IN DOING THINGS: NOT AT ALL
SUM OF ALL RESPONSES TO PHQ QUESTIONS 1-9: 0

## (undated) DEVICE — TRAP ENDOSCP POLYP 2 CHMBR DRAWER TYP

## (undated) DEVICE — SET GRAV CK VLV NEEDLESS ST 3 GANGED 4WAY STPCOCK HI FLO 10

## (undated) DEVICE — CUFF BLD PRSS AD CLTH SGL TB W/ BAYNT CONN ROUNDED CORNER

## (undated) DEVICE — ENDOSCOPIC KIT COMPLIANCE ENDOKIT

## (undated) DEVICE — IV START KIT: Brand: MEDLINE

## (undated) DEVICE — TIP SUCT TRNSPAR RIB SURF STD BLB RIG NVENT W/ 5IN1 CONN DYND50138] MEDLINE INDUSTRIES INC]